# Patient Record
Sex: MALE | Race: WHITE | NOT HISPANIC OR LATINO | Employment: OTHER | URBAN - METROPOLITAN AREA
[De-identification: names, ages, dates, MRNs, and addresses within clinical notes are randomized per-mention and may not be internally consistent; named-entity substitution may affect disease eponyms.]

---

## 2017-01-02 ENCOUNTER — RESULTS ENCOUNTER (OUTPATIENT)
Dept: INTERNAL MEDICINE | Facility: CLINIC | Age: 72
End: 2017-01-02

## 2017-01-02 DIAGNOSIS — E78.5 HYPERLIPIDEMIA: ICD-10-CM

## 2017-01-02 DIAGNOSIS — D69.6 THROMBOCYTOPENIA (HCC): ICD-10-CM

## 2017-01-02 DIAGNOSIS — R73.02 IGT (IMPAIRED GLUCOSE TOLERANCE): ICD-10-CM

## 2017-01-21 LAB
BASOPHILS # BLD AUTO: 0 X10E3/UL (ref 0–0.2)
BASOPHILS NFR BLD AUTO: 1 %
CHOLEST SERPL-MCNC: 131 MG/DL (ref 100–199)
EOSINOPHIL # BLD AUTO: 0.1 X10E3/UL (ref 0–0.4)
EOSINOPHIL NFR BLD AUTO: 2 %
ERYTHROCYTE [DISTWIDTH] IN BLOOD BY AUTOMATED COUNT: 13.9 % (ref 12.3–15.4)
HBA1C MFR BLD: 6.3 % (ref 4.8–5.6)
HCT VFR BLD AUTO: 40.8 % (ref 37.5–51)
HDLC SERPL-MCNC: 51 MG/DL
HGB BLD-MCNC: 13.9 G/DL (ref 12.6–17.7)
IMM GRANULOCYTES # BLD: 0 X10E3/UL (ref 0–0.1)
IMM GRANULOCYTES NFR BLD: 0 %
LDLC SERPL CALC-MCNC: 48 MG/DL (ref 0–99)
LYMPHOCYTES # BLD AUTO: 1.4 X10E3/UL (ref 0.7–3.1)
LYMPHOCYTES NFR BLD AUTO: 22 %
MCH RBC QN AUTO: 27.9 PG (ref 26.6–33)
MCHC RBC AUTO-ENTMCNC: 34.1 G/DL (ref 31.5–35.7)
MCV RBC AUTO: 82 FL (ref 79–97)
MONOCYTES # BLD AUTO: 0.7 X10E3/UL (ref 0.1–0.9)
MONOCYTES NFR BLD AUTO: 11 %
NEUTROPHILS # BLD AUTO: 4.1 X10E3/UL (ref 1.4–7)
NEUTROPHILS NFR BLD AUTO: 64 %
PLATELET # BLD AUTO: 126 X10E3/UL (ref 150–379)
RBC # BLD AUTO: 4.99 X10E6/UL (ref 4.14–5.8)
TRIGL SERPL-MCNC: 161 MG/DL (ref 0–149)
VLDLC SERPL CALC-MCNC: 32 MG/DL (ref 5–40)
WBC # BLD AUTO: 6.4 X10E3/UL (ref 3.4–10.8)

## 2017-01-26 ENCOUNTER — OFFICE VISIT (OUTPATIENT)
Dept: INTERNAL MEDICINE | Facility: CLINIC | Age: 72
End: 2017-01-26

## 2017-01-26 VITALS
DIASTOLIC BLOOD PRESSURE: 52 MMHG | HEART RATE: 70 BPM | TEMPERATURE: 97.6 F | HEIGHT: 70 IN | SYSTOLIC BLOOD PRESSURE: 118 MMHG | WEIGHT: 207.2 LBS | RESPIRATION RATE: 16 BRPM | BODY MASS INDEX: 29.66 KG/M2

## 2017-01-26 DIAGNOSIS — D69.6 THROMBOCYTOPENIA (HCC): ICD-10-CM

## 2017-01-26 DIAGNOSIS — E78.5 HYPERLIPIDEMIA, UNSPECIFIED HYPERLIPIDEMIA TYPE: ICD-10-CM

## 2017-01-26 DIAGNOSIS — N40.1 BENIGN NON-NODULAR PROSTATIC HYPERPLASIA WITH LOWER URINARY TRACT SYMPTOMS: ICD-10-CM

## 2017-01-26 DIAGNOSIS — R73.02 IGT (IMPAIRED GLUCOSE TOLERANCE): Primary | ICD-10-CM

## 2017-01-26 DIAGNOSIS — I47.1 PAT (PAROXYSMAL ATRIAL TACHYCARDIA) (HCC): ICD-10-CM

## 2017-01-26 DIAGNOSIS — K21.9 GASTROESOPHAGEAL REFLUX DISEASE, ESOPHAGITIS PRESENCE NOT SPECIFIED: ICD-10-CM

## 2017-01-26 DIAGNOSIS — Z13.6 SCREENING FOR AAA (ABDOMINAL AORTIC ANEURYSM): ICD-10-CM

## 2017-01-26 DIAGNOSIS — Z79.899 MEDICATION MANAGEMENT: ICD-10-CM

## 2017-01-26 DIAGNOSIS — Z11.59 NEED FOR HEPATITIS C SCREENING TEST: ICD-10-CM

## 2017-01-26 LAB — GLUCOSE BLDC GLUCOMTR-MCNC: 131 MG/DL (ref 70–130)

## 2017-01-26 PROCEDURE — 90715 TDAP VACCINE 7 YRS/> IM: CPT | Performed by: INTERNAL MEDICINE

## 2017-01-26 PROCEDURE — 99214 OFFICE O/P EST MOD 30 MIN: CPT | Performed by: INTERNAL MEDICINE

## 2017-01-26 PROCEDURE — 90670 PCV13 VACCINE IM: CPT | Performed by: INTERNAL MEDICINE

## 2017-01-26 PROCEDURE — 82962 GLUCOSE BLOOD TEST: CPT | Performed by: INTERNAL MEDICINE

## 2017-01-26 PROCEDURE — G0009 ADMIN PNEUMOCOCCAL VACCINE: HCPCS | Performed by: INTERNAL MEDICINE

## 2017-01-26 PROCEDURE — 90472 IMMUNIZATION ADMIN EACH ADD: CPT | Performed by: INTERNAL MEDICINE

## 2017-01-26 NOTE — PROGRESS NOTES
Subjective   Scout Linda is a 71 y.o. male.     Chief Complaint   Patient presents with   • Hyperlipidemia   • Benign Prostatic Hypertrophy         Hyperlipidemia   This is a chronic problem. The current episode started more than 1 year ago. The problem is controlled. Recent lipid tests were reviewed and are normal. There are no known factors aggravating his hyperlipidemia. Pertinent negatives include no chest pain, myalgias or shortness of breath. Current antihyperlipidemic treatment includes statins. The current treatment provides significant improvement of lipids. There are no compliance problems.  Risk factors for coronary artery disease include dyslipidemia and male sex.        The following portions of the patient's history were reviewed and updated as appropriate: allergies, current medications, past social history and problem list.    Outpatient Prescriptions Marked as Taking for the 1/26/17 encounter (Office Visit) with Scout Callaway MD   Medication Sig Dispense Refill   • aspirin 81 MG chewable tablet Chew 81 mg daily.     • doxazosin (CARDURA) 4 MG tablet Take 4 mg by mouth every night.     • finasteride (PROSCAR) 5 MG tablet Take 5 mg by mouth daily.     • MethylPREDNISolone (MEDROL, CECILIA,) 4 MG tablet Take as directed on package instructions. 1 each 0   • metoprolol tartrate (LOPRESSOR) 50 MG tablet Take 25 mg by mouth 2 (two) times a day.     • omeprazole (PriLOSEC) 20 MG capsule Take 20 mg by mouth daily.     • simvastatin (ZOCOR) 40 MG tablet Take 40 mg by mouth daily.     • [DISCONTINUED] gabapentin (NEURONTIN) 300 MG capsule Take 1 capsule by mouth every night. 90 capsule 0       Review of Systems   Constitutional: Negative for chills, fatigue and fever.   Respiratory: Negative for cough, shortness of breath and wheezing.    Cardiovascular: Negative for chest pain, palpitations and leg swelling.   Gastrointestinal: Negative for abdominal pain, constipation, diarrhea, nausea and vomiting.    Musculoskeletal: Negative for myalgias.       Objective   Vitals:    01/26/17 1257   BP: 118/52   Pulse: 70   Resp: 16   Temp: 97.6 °F (36.4 °C)      Last Weight    01/26/17  1257   Weight: 207 lb 3.2 oz (94 kg)    [unfilled]  Body mass index is 29.73 kg/(m^2).      Physical Exam   Constitutional: He appears well-developed and well-nourished. No distress.   HENT:   Head: Normocephalic and atraumatic.   Neck: Carotid bruit is not present. No thyromegaly present.   Cardiovascular: Normal rate, regular rhythm, normal heart sounds and intact distal pulses.  Exam reveals no gallop.    No murmur heard.  Pulmonary/Chest: Effort normal and breath sounds normal. No respiratory distress. He has no wheezes. He has no rales.   Abdominal: Soft. Bowel sounds are normal. He exhibits no mass. There is no tenderness. There is no guarding.   Genitourinary: Rectum normal and prostate normal.         Problem List Items Addressed This Visit        Cardiovascular and Mediastinum    PAT (paroxysmal atrial tachycardia)    Hyperlipidemia    Relevant Orders    Lipid Panel       Digestive    GERD (gastroesophageal reflux disease)       Endocrine    IGT (impaired glucose tolerance) - Primary    Relevant Orders    POC Glucose Fingerstick (Completed)    Hemoglobin A1c       Genitourinary    BPH (benign prostatic hypertrophy)    Relevant Orders    PSA       Hematopoietic and Hemostatic    Thrombocytopenia      Other Visit Diagnoses     Medication management        Relevant Orders    CBC & Differential    Comprehensive Metabolic Panel    Screening for AAA (abdominal aortic aneurysm)        Relevant Orders    US Aorta Medicare Screening    Need for hepatitis C screening test            Assessment/Plan   In for recheck of lipids, low back pain, Igt, and OA.  Back is much better.  Off of treatment.  Annual labs July 2016.  Plts a little low.  Otherwise stable and looking good.  Will continue monitor lipids, glu, a1c, and cbc q 6 mos.  We'll get  Prevnar in TD AP updated today.  Due for AAA screen.         Dragon disclaimer:   Much of this encounter note is an electronic transcription/translation of spoken language to printed text. The electronic translation of spoken language may permit erroneous, or at times, nonsensical words or phrases to be inadvertently transcribed; Although I have reviewed the note for such errors, some may still exist.

## 2017-01-26 NOTE — MR AVS SNAPSHOT
Scout Linda   1/26/2017 1:10 PM   Office Visit    Provider:  Scout Callaway MD   Department:  [facility] INTERNAL MEDICINE   Dept Phone:  [phone]                Your Full Care Plan              Today's Medication Changes          These changes are accurate as of: 1/26/17  2:00 PM.  If you have any questions, ask your nurse or doctor.               Stop taking medication(s)listed here:     gabapentin 300 MG capsule   Commonly known as:  NEURONTIN   Stopped by:  Scout Callaway MD                      Your Updated Medication List          This list is accurate as of: 1/26/17  2:00 PM.  Always use your most recent med list.                aspirin 81 MG chewable tablet       doxazosin 4 MG tablet   Commonly known as:  CARDURA       finasteride 5 MG tablet   Commonly known as:  PROSCAR       MethylPREDNISolone 4 MG tablet   Commonly known as:  MEDROL (CECILIA)   Take as directed on package instructions.       metoprolol tartrate 50 MG tablet   Commonly known as:  LOPRESSOR       omeprazole 20 MG capsule   Commonly known as:  priLOSEC       simvastatin 40 MG tablet   Commonly known as:  ZOCOR               We Performed the Following     Pneumococcal Conjugate Vaccine 13-Valent All     POC Glucose Fingerstick     Tdap Vaccine Greater Than or Equal To 6yo IM       You Were Diagnosed With        Codes Comments    IGT (impaired glucose tolerance)    -  Primary ICD-10-CM: R73.02  ICD-9-CM: 790.22     Hyperlipidemia, unspecified hyperlipidemia type     ICD-10-CM: E78.5  ICD-9-CM: 272.4     Gastroesophageal reflux disease, esophagitis presence not specified     ICD-10-CM: K21.9  ICD-9-CM: 530.81     PAT (paroxysmal atrial tachycardia)     ICD-10-CM: I47.1  ICD-9-CM: 427.0     Thrombocytopenia     ICD-10-CM: D69.6  ICD-9-CM: 287.5     Benign non-nodular prostatic hyperplasia with lower urinary tract symptoms     ICD-10-CM: N40.1  ICD-9-CM: 600.91     Medication management      "ICD-10-CM: Z79.899  ICD-9-CM: V58.69     Screening for AAA (abdominal aortic aneurysm)     ICD-10-CM: Z13.6  ICD-9-CM: V81.2     Need for hepatitis C screening test     ICD-10-CM: Z11.59  ICD-9-CM: V73.89       Instructions     None    Patient Instructions History      [employer] Signup     [facility] [place] [employer] allows you to send messages to your doctor, view your test results, renew your prescriptions, schedule appointments, and more. To sign up, go to [email or web address] and click on the Sign Up Now link in the New User? box. Enter your [employer] Activation Code exactly as it appears below along with the last four digits of your Social Security Number and your Date of Birth () to complete the sign-up process. If you do not sign up before the expiration date, you must request a new code.    [employer]EX Activation Code: [email or web address]  Expires: 2017  1:59 PM    If you have questions, you can email [email or web address] or call [phone] to talk to our [employer] staff. Remember, S[employer]EX is NOT to be used for urgent needs. For medical emergencies, dial 911.               Other Info from Your Visit           Your Appointments     2017  1:00 PM EDT   Follow Up with Scout Callaway MD   [facility] INTERNAL MEDICINE (--)    [address] [address] [address] [phone]           Arrive 15 minutes prior to appointment.            2017  4:10 PM EDT   Subsequent Medicare Wellness with Scout Callaway MD   [facility] INTERNAL MEDICINE (--)    [address] [address] [phone]              Allergies     No Known Allergies      Reason for Visit     Hyperlipidemia     Benign Prostatic Hypertrophy           Vital Signs     Blood Pressure Pulse Temperature Respirations Height Weight    118/52 (BP Location: Left arm, Patient Position: Sitting) 70 97.6 °F (36.4 °C) 16 70\" (177.8 cm) 207 lb 3.2 oz (94 kg)    Body Mass " Index Smoking Status                29.73 kg/m2 Former Smoker          Problems and Diagnoses Noted     Benign enlargement of prostate    Acid reflux disease    High cholesterol or triglycerides    IGT (impaired glucose tolerance)    Irregular heartbeat    Decreased platelet count    Pharmacologic therapy        Screening for abdominal aortic aneurysm        Need for hepatitis C screening test          Immunizations Administered     Name Date    Pneumococcal Conjugate 13-Valent     Tdap       Results     POC Glucose Fingerstick      Component Value Standard Range & Units    Glucose 131 70 - 130 mg/dL

## 2017-02-03 DIAGNOSIS — Z13.6 SCREENING FOR AAA (ABDOMINAL AORTIC ANEURYSM): Primary | ICD-10-CM

## 2017-02-06 ENCOUNTER — HOSPITAL ENCOUNTER (OUTPATIENT)
Dept: ULTRASOUND IMAGING | Facility: HOSPITAL | Age: 72
Discharge: HOME | End: 2017-02-06
Attending: INTERNAL MEDICINE | Admitting: INTERNAL MEDICINE

## 2017-02-06 DIAGNOSIS — Z13.6 SCREENING FOR AAA (ABDOMINAL AORTIC ANEURYSM): ICD-10-CM

## 2017-02-06 PROCEDURE — 76706 US ABDL AORTA SCREEN AAA: CPT

## 2017-07-11 LAB
ALBUMIN SERPL-MCNC: 4.2 G/DL (ref 3.6–4.8)
ALBUMIN/GLOB SERPL: 1.8 {RATIO} (ref 1.2–2.2)
ALP SERPL-CCNC: 41 IU/L (ref 39–117)
ALT SERPL-CCNC: 19 IU/L (ref 0–44)
AST SERPL-CCNC: 13 IU/L (ref 0–40)
BASOPHILS # BLD AUTO: 0 X10E3/UL (ref 0–0.2)
BASOPHILS NFR BLD AUTO: 0 %
BILIRUB SERPL-MCNC: 0.4 MG/DL (ref 0–1.2)
BUN SERPL-MCNC: 14 MG/DL (ref 8–27)
BUN/CREAT SERPL: 11 (ref 10–24)
CALCIUM SERPL-MCNC: 9.3 MG/DL (ref 8.6–10.2)
CHLORIDE SERPL-SCNC: 106 MMOL/L (ref 96–106)
CHOLEST SERPL-MCNC: 126 MG/DL (ref 100–199)
CO2 SERPL-SCNC: 23 MMOL/L (ref 18–29)
CREAT SERPL-MCNC: 1.3 MG/DL (ref 0.76–1.27)
EOSINOPHIL # BLD AUTO: 0.1 X10E3/UL (ref 0–0.4)
EOSINOPHIL NFR BLD AUTO: 2 %
ERYTHROCYTE [DISTWIDTH] IN BLOOD BY AUTOMATED COUNT: 13.7 % (ref 12.3–15.4)
GLOBULIN SER CALC-MCNC: 2.3 G/DL (ref 1.5–4.5)
GLUCOSE SERPL-MCNC: 125 MG/DL (ref 65–99)
HBA1C MFR BLD: 6.3 % (ref 4.8–5.6)
HCT VFR BLD AUTO: 40.3 % (ref 37.5–51)
HCV AB S/CO SERPL IA: 0.1 S/CO RATIO (ref 0–0.9)
HDLC SERPL-MCNC: 44 MG/DL
HGB BLD-MCNC: 13.5 G/DL (ref 12.6–17.7)
IMM GRANULOCYTES # BLD: 0 X10E3/UL (ref 0–0.1)
IMM GRANULOCYTES NFR BLD: 0 %
LDLC SERPL CALC-MCNC: 40 MG/DL (ref 0–99)
LYMPHOCYTES # BLD AUTO: 1.6 X10E3/UL (ref 0.7–3.1)
LYMPHOCYTES NFR BLD AUTO: 28 %
MCH RBC QN AUTO: 28.1 PG (ref 26.6–33)
MCHC RBC AUTO-ENTMCNC: 33.5 G/DL (ref 31.5–35.7)
MCV RBC AUTO: 84 FL (ref 79–97)
MONOCYTES # BLD AUTO: 0.4 X10E3/UL (ref 0.1–0.9)
MONOCYTES NFR BLD AUTO: 8 %
NEUTROPHILS # BLD AUTO: 3.6 X10E3/UL (ref 1.4–7)
NEUTROPHILS NFR BLD AUTO: 62 %
PLATELET # BLD AUTO: 128 X10E3/UL (ref 150–379)
POTASSIUM SERPL-SCNC: 4.7 MMOL/L (ref 3.5–5.2)
PROT SERPL-MCNC: 6.5 G/DL (ref 6–8.5)
PSA SERPL-MCNC: 2.8 NG/ML (ref 0–4)
RBC # BLD AUTO: 4.81 X10E6/UL (ref 4.14–5.8)
SODIUM SERPL-SCNC: 145 MMOL/L (ref 134–144)
TRIGL SERPL-MCNC: 210 MG/DL (ref 0–149)
VLDLC SERPL CALC-MCNC: 42 MG/DL (ref 5–40)
WBC # BLD AUTO: 5.7 X10E3/UL (ref 3.4–10.8)

## 2017-07-12 ENCOUNTER — RESULTS ENCOUNTER (OUTPATIENT)
Dept: INTERNAL MEDICINE | Facility: CLINIC | Age: 72
End: 2017-07-12

## 2017-07-12 DIAGNOSIS — E78.5 HYPERLIPIDEMIA, UNSPECIFIED HYPERLIPIDEMIA TYPE: ICD-10-CM

## 2017-07-12 DIAGNOSIS — R73.02 IGT (IMPAIRED GLUCOSE TOLERANCE): ICD-10-CM

## 2017-07-12 DIAGNOSIS — Z79.899 MEDICATION MANAGEMENT: ICD-10-CM

## 2017-07-12 DIAGNOSIS — N40.1 BENIGN NON-NODULAR PROSTATIC HYPERPLASIA WITH LOWER URINARY TRACT SYMPTOMS: ICD-10-CM

## 2017-07-12 DIAGNOSIS — Z11.59 NEED FOR HEPATITIS C SCREENING TEST: ICD-10-CM

## 2017-07-14 ENCOUNTER — OFFICE VISIT (OUTPATIENT)
Dept: INTERNAL MEDICINE | Facility: CLINIC | Age: 72
End: 2017-07-14

## 2017-07-14 VITALS
RESPIRATION RATE: 16 BRPM | OXYGEN SATURATION: 96 % | BODY MASS INDEX: 29.03 KG/M2 | SYSTOLIC BLOOD PRESSURE: 128 MMHG | WEIGHT: 202.8 LBS | HEIGHT: 70 IN | TEMPERATURE: 97.7 F | HEART RATE: 72 BPM | DIASTOLIC BLOOD PRESSURE: 74 MMHG

## 2017-07-14 DIAGNOSIS — I47.1 PAT (PAROXYSMAL ATRIAL TACHYCARDIA) (HCC): ICD-10-CM

## 2017-07-14 DIAGNOSIS — N40.1 BENIGN NON-NODULAR PROSTATIC HYPERPLASIA WITH LOWER URINARY TRACT SYMPTOMS: ICD-10-CM

## 2017-07-14 DIAGNOSIS — E78.5 HYPERLIPIDEMIA, UNSPECIFIED HYPERLIPIDEMIA TYPE: Primary | ICD-10-CM

## 2017-07-14 DIAGNOSIS — D69.6 THROMBOCYTOPENIA (HCC): ICD-10-CM

## 2017-07-14 DIAGNOSIS — R73.02 IGT (IMPAIRED GLUCOSE TOLERANCE): ICD-10-CM

## 2017-07-14 PROCEDURE — 99214 OFFICE O/P EST MOD 30 MIN: CPT | Performed by: INTERNAL MEDICINE

## 2017-07-14 PROCEDURE — G0439 PPPS, SUBSEQ VISIT: HCPCS | Performed by: INTERNAL MEDICINE

## 2017-07-14 NOTE — PROGRESS NOTES
QUICK REFERENCE INFORMATION:  The ABCs of the Annual Wellness Visit    Subsequent Medicare Wellness Visit    HEALTH RISK ASSESSMENT    1945    Recent Hospitalizations:  No hospitalization(s) within the last year..        Current Medical Providers:  Patient Care Team:  Scout Callaway MD as PCP - Internal Medicine (Internal Medicine)  Scout Callaway MD as PCP - Claims Attributed        Smoking Status:  History   Smoking Status   • Former Smoker   • Years: 11.00   • Quit date: 12/1987   Smokeless Tobacco   • Not on file       Alcohol Consumption:  History   Alcohol Use   • Yes     Comment: 3 beers per week       Depression Screen:   PHQ-9 Depression Screening 7/14/2017   Little interest or pleasure in doing things 0   Feeling down, depressed, or hopeless 0   Trouble falling or staying asleep, or sleeping too much 0   Feeling tired or having little energy 0   Poor appetite or overeating 0   Feeling bad about yourself - or that you are a failure or have let yourself or your family down 0   Trouble concentrating on things, such as reading the newspaper or watching television 0   Moving or speaking so slowly that other people could have noticed. Or the opposite - being so fidgety or restless that you have been moving around a lot more than usual 0   Thoughts that you would be better off dead, or of hurting yourself in some way 0   PHQ-9 Total Score 0       Health Habits and Functional and Cognitive Screening:  Functional & Cognitive Status 7/14/2017   Do you have difficulty preparing food and eating? No   Do you have difficulty bathing yourself? No   Do you have difficulty getting dressed? No   Do you have difficulty using the toilet? No   Do you have difficulty moving around from place to place? No   In the past year have you fallen or experienced a near fall? No   Do you need help using the phone?  No   Are you deaf or do you have serious difficulty hearing?  No   Do you need help with transportation? No   Do you  need help shopping? No   Do you need help preparing meals?  No   Do you need help with housework?  No   Do you need help with laundry? No   Do you need help taking your medications? No   Do you need help managing money? No   Do you have difficulty concentrating, remembering or making decisions? No       Health Habits  Current Diet: Well Balanced Diet  Dental Exam: Up to date  Eye Exam: Up to date  Exercise (times per week): 2 times per week  Current Exercise Activities Include:  (golf)      Does the patient have evidence of cognitive impairment? No    Aspirin use counseling: Taking ASA appropriately as indicated      Recent Lab Results:  CMP:  Lab Results   Component Value Date     (H) 07/10/2017    BUN 14 07/10/2017    CREATININE 1.30 (H) 07/10/2017    EGFRIFNONA 56 (L) 07/10/2017    EGFRIFAFRI 64 07/10/2017    BCR 11 07/10/2017     (H) 07/10/2017    K 4.7 07/10/2017    CO2 23 07/10/2017    CALCIUM 9.3 07/10/2017    PROTENTOTREF 6.5 07/10/2017    ALBUMIN 4.2 07/10/2017    LABGLOBREF 2.3 07/10/2017    LABIL2 1.8 07/10/2017    BILITOT 0.4 07/10/2017    ALKPHOS 41 07/10/2017    AST 13 07/10/2017    ALT 19 07/10/2017     Lipid Panel:  Lab Results   Component Value Date    TRIG 210 (H) 07/10/2017    HDL 44 07/10/2017    VLDL 42 (H) 07/10/2017    LDLHDL 1.6 07/05/2016     HbA1c:  Lab Results   Component Value Date    HGBA1C 6.3 (H) 07/10/2017       Visual Acuity:  No exam data present    Age-appropriate Screening Schedule:  Refer to the list below for future screening recommendations based on patient's age, sex and/or medical conditions. Orders for these recommended tests are listed in the plan section. The patient has been provided with a written plan.    Health Maintenance   Topic Date Due   • INFLUENZA VACCINE  08/01/2017   • LIPID PANEL  07/10/2018   • COLONOSCOPY  06/22/2019   • TDAP/TD VACCINES (2 - Td) 01/26/2027   • PNEUMOCOCCAL VACCINES (65+ LOW/MEDIUM RISK)  Completed   • ZOSTER VACCINE  Completed  "       Subjective   History of Present Illness    Scout Linda is a 71 y.o. male who presents for an Subsequent Wellness Visit.    The following portions of the patient's history were reviewed and updated as appropriate: allergies, current medications, past family history, past medical history, past social history, past surgical history and problem list.    Outpatient Medications Prior to Visit   Medication Sig Dispense Refill   • aspirin 81 MG chewable tablet Chew 81 mg daily.     • doxazosin (CARDURA) 4 MG tablet Take 4 mg by mouth every night.     • finasteride (PROSCAR) 5 MG tablet Take 5 mg by mouth daily.     • metoprolol tartrate (LOPRESSOR) 50 MG tablet Take 25 mg by mouth 2 (two) times a day.     • omeprazole (PriLOSEC) 20 MG capsule Take 20 mg by mouth daily.     • simvastatin (ZOCOR) 40 MG tablet Take 40 mg by mouth daily.     • MethylPREDNISolone (MEDROL, CECILIA,) 4 MG tablet Take as directed on package instructions. 1 each 0     No facility-administered medications prior to visit.        Patient Active Problem List   Diagnosis   • Low back pain   • PAT (paroxysmal atrial tachycardia)   • GERD (gastroesophageal reflux disease)   • Polyp of colon, adenomatous   • IGT (impaired glucose tolerance)   • BPH (benign prostatic hypertrophy)   • Hyperlipidemia   • Spindle cell liposarcoma   • Thrombocytopenia       Advance Care Planning:  has an advance directive - a copy HAS NOT been provided    Identification of Risk Factors:  Risk factors include: no factors apply.    Review of Systems    Compared to one year ago, the patient feels his physical health is the same.  Compared to one year ago, the patient feels his mental health is the same.    Objective     Physical Exam    Vitals:    07/14/17 1315   BP: 128/74   BP Location: Left arm   Patient Position: Sitting   Cuff Size: Large Adult   Pulse: 72   Resp: 16   Temp: 97.7 °F (36.5 °C)   TempSrc: Oral   SpO2: 96%   Weight: 202 lb 12.8 oz (92 kg)   Height: 70\" " (177.8 cm)   PainSc: 0-No pain       Body mass index is 29.1 kg/(m^2).  Discussed the patient's BMI with him. The BMI is in the acceptable range.    Assessment/Plan   Patient Self-Management and Personalized Health Advice  The patient has been provided with information about: back pain and preventive services including:   · managing low back pain.    Visit Diagnoses:    ICD-10-CM ICD-9-CM   1. Hyperlipidemia, unspecified hyperlipidemia type E78.5 272.4   2. Benign non-nodular prostatic hyperplasia with lower urinary tract symptoms N40.1 600.91   3. IGT (impaired glucose tolerance) R73.02 790.22   4. PAT (paroxysmal atrial tachycardia) I47.1 427.0   5. Thrombocytopenia D69.6 287.5       Orders Placed This Encounter   Procedures   • Hemoglobin A1c     Standing Status:   Future   • Lipid Panel     Standing Status:   Future   • CBC & Differential     Standing Status:   Future     Order Specific Question:   Manual Differential     Answer:   No       Outpatient Encounter Prescriptions as of 7/14/2017   Medication Sig Dispense Refill   • aspirin 81 MG chewable tablet Chew 81 mg daily.     • doxazosin (CARDURA) 4 MG tablet Take 4 mg by mouth every night.     • finasteride (PROSCAR) 5 MG tablet Take 5 mg by mouth daily.     • metoprolol tartrate (LOPRESSOR) 50 MG tablet Take 25 mg by mouth 2 (two) times a day.     • omeprazole (PriLOSEC) 20 MG capsule Take 20 mg by mouth daily.     • simvastatin (ZOCOR) 40 MG tablet Take 40 mg by mouth daily.     • [DISCONTINUED] MethylPREDNISolone (MEDROL, CECILIA,) 4 MG tablet Take as directed on package instructions. 1 each 0     No facility-administered encounter medications on file as of 7/14/2017.        Reviewed use of high risk medication in the elderly: yes  Reviewed for potential of harmful drug interactions in the elderly: yes    Follow Up:  Return in about 3 months (around 10/14/2017) for lipids, igt, pat.     An After Visit Summary and PPPS with all of these plans were given to the  patient.

## 2017-07-14 NOTE — PATIENT INSTRUCTIONS
Back Pain, Adult  Back pain is very common in adults. The cause of back pain is rarely dangerous and the pain often gets better over time. The cause of your back pain may not be known. Some common causes of back pain include:  · Strain of the muscles or ligaments supporting the spine.  · Wear and tear (degeneration) of the spinal disks.  · Arthritis.  · Direct injury to the back.  For many people, back pain may return. Since back pain is rarely dangerous, most people can learn to manage this condition on their own.  HOME CARE INSTRUCTIONS  Watch your back pain for any changes. The following actions may help to lessen any discomfort you are feeling:  · Remain active. It is stressful on your back to sit or  one place for long periods of time. Do not sit, drive, or  one place for more than 30 minutes at a time. Take short walks on even surfaces as soon as you are able. Try to increase the length of time you walk each day.  · Exercise regularly as directed by your health care provider. Exercise helps your back heal faster. It also helps avoid future injury by keeping your muscles strong and flexible.  · Do not stay in bed. Resting more than 1-2 days can delay your recovery.  · Pay attention to your body when you bend and lift. The most comfortable positions are those that put less stress on your recovering back. Always use proper lifting techniques, including:    Bending your knees.    Keeping the load close to your body.    Avoiding twisting.  · Find a comfortable position to sleep. Use a firm mattress and lie on your side with your knees slightly bent. If you lie on your back, put a pillow under your knees.  · Avoid feeling anxious or stressed. Stress increases muscle tension and can worsen back pain. It is important to recognize when you are anxious or stressed and learn ways to manage it, such as with exercise.  · Take medicines only as directed by your health care provider. Over-the-counter  medicines to reduce pain and inflammation are often the most helpful. Your health care provider may prescribe muscle relaxant drugs. These medicines help dull your pain so you can more quickly return to your normal activities and healthy exercise.  · Apply ice to the injured area:    Put ice in a plastic bag.    Place a towel between your skin and the bag.    Leave the ice on for 20 minutes, 2-3 times a day for the first 2-3 days. After that, ice and heat may be alternated to reduce pain and spasms.  · Maintain a healthy weight. Excess weight puts extra stress on your back and makes it difficult to maintain good posture.  SEEK MEDICAL CARE IF:  · You have pain that is not relieved with rest or medicine.  · You have increasing pain going down into the legs or buttocks.  · You have pain that does not improve in one week.  · You have night pain.  · You lose weight.  · You have a fever or chills.  SEEK IMMEDIATE MEDICAL CARE IF:   · You develop new bowel or bladder control problems.  · You have unusual weakness or numbness in your arms or legs.  · You develop nausea or vomiting.  · You develop abdominal pain.  · You feel faint.     This information is not intended to replace advice given to you by your health care provider. Make sure you discuss any questions you have with your health care provider.     Document Released: 12/18/2006 Document Revised: 01/08/2016 Document Reviewed: 04/21/2015  [employer] Interactive Patient Education ©2017 [employer]

## 2017-07-14 NOTE — PROGRESS NOTES
Subjective   Scout Linda is a 71 y.o. male.     Chief Complaint   Patient presents with   • Hyperlipidemia     igt   • Heartburn         Hyperlipidemia   This is a chronic problem. The current episode started more than 1 year ago. The problem is controlled. Recent lipid tests were reviewed and are normal. There are no known factors aggravating his hyperlipidemia. Pertinent negatives include no chest pain, myalgias or shortness of breath. Current antihyperlipidemic treatment includes statins. The current treatment provides significant improvement of lipids. There are no compliance problems.  Risk factors for coronary artery disease include dyslipidemia and male sex.   Heartburn   He reports no abdominal pain, no chest pain, no coughing, no nausea or no wheezing. This is a chronic problem. The current episode started more than 1 year ago. The problem occurs rarely. The problem has been unchanged. Pertinent negatives include no fatigue. He has tried a PPI for the symptoms. The treatment provided significant relief.        The following portions of the patient's history were reviewed and updated as appropriate: allergies, current medications, past social history and problem list.    Outpatient Prescriptions Marked as Taking for the 7/14/17 encounter (Office Visit) with Scout Callaway MD   Medication Sig Dispense Refill   • aspirin 81 MG chewable tablet Chew 81 mg daily.     • doxazosin (CARDURA) 4 MG tablet Take 4 mg by mouth every night.     • finasteride (PROSCAR) 5 MG tablet Take 5 mg by mouth daily.     • metoprolol tartrate (LOPRESSOR) 50 MG tablet Take 25 mg by mouth 2 (two) times a day.     • omeprazole (PriLOSEC) 20 MG capsule Take 20 mg by mouth daily.     • simvastatin (ZOCOR) 40 MG tablet Take 40 mg by mouth daily.     • [DISCONTINUED] MethylPREDNISolone (MEDROL, CECILIA,) 4 MG tablet Take as directed on package instructions. 1 each 0       Review of Systems   Constitutional: Negative for chills, fatigue and  fever.   Respiratory: Negative for cough, shortness of breath and wheezing.    Cardiovascular: Negative for chest pain, palpitations and leg swelling.   Gastrointestinal: Negative for abdominal pain, constipation, diarrhea, nausea and vomiting.   Musculoskeletal: Negative for myalgias.       Objective   Vitals:    07/14/17 1315   BP: 128/74   Pulse: 72   Resp: 16   Temp: 97.7 °F (36.5 °C)   SpO2: 96%      Last Weight    07/14/17  1315   Weight: 202 lb 12.8 oz (92 kg)    [unfilled]  Body mass index is 29.1 kg/(m^2).      Physical Exam   Constitutional: He appears well-developed and well-nourished. No distress.   HENT:   Head: Normocephalic and atraumatic.   Neck: Carotid bruit is not present.   Cardiovascular: Normal rate, regular rhythm, normal heart sounds and intact distal pulses.  Exam reveals no gallop.    No murmur heard.  Pulmonary/Chest: Effort normal and breath sounds normal. No respiratory distress. He has no wheezes. He has no rales.   Abdominal: Soft. Bowel sounds are normal. He exhibits no mass. There is no tenderness. There is no guarding.         Problem List Items Addressed This Visit        Cardiovascular and Mediastinum    PAT (paroxysmal atrial tachycardia)    Hyperlipidemia - Primary       Endocrine    IGT (impaired glucose tolerance)       Genitourinary    BPH (benign prostatic hypertrophy)        Assessment/Plan   In for recheck of lipids, GERD, Igt, and OA.  GERD is well controlled on Prilosec daily.  Tolerating lipid treatment well.  Annual labs today July 2017.  Plts a little low.  Otherwise stable and looking good.  Will continue monitor lipids, glu, a1c, and cbc q 6 mos.  Annual wellness exam today.         Dragon disclaimer:   Much of this encounter note is an electronic transcription/translation of spoken language to printed text. The electronic translation of spoken language may permit erroneous, or at times, nonsensical words or phrases to be inadvertently transcribed; Although I  have reviewed the note for such errors, some may still exist.

## 2018-01-01 ENCOUNTER — RESULTS ENCOUNTER (OUTPATIENT)
Dept: INTERNAL MEDICINE | Facility: CLINIC | Age: 73
End: 2018-01-01

## 2018-01-01 DIAGNOSIS — E78.5 HYPERLIPIDEMIA, UNSPECIFIED HYPERLIPIDEMIA TYPE: ICD-10-CM

## 2018-01-01 DIAGNOSIS — D69.6 THROMBOCYTOPENIA (HCC): ICD-10-CM

## 2018-01-01 DIAGNOSIS — R73.02 IGT (IMPAIRED GLUCOSE TOLERANCE): ICD-10-CM

## 2018-01-09 LAB
BASOPHILS # BLD AUTO: 0 X10E3/UL (ref 0–0.2)
BASOPHILS NFR BLD AUTO: 0 %
CHOLEST SERPL-MCNC: 145 MG/DL (ref 100–199)
EOSINOPHIL # BLD AUTO: 0.1 X10E3/UL (ref 0–0.4)
EOSINOPHIL NFR BLD AUTO: 1 %
ERYTHROCYTE [DISTWIDTH] IN BLOOD BY AUTOMATED COUNT: 13.9 % (ref 12.3–15.4)
HBA1C MFR BLD: 6.5 % (ref 4.8–5.6)
HCT VFR BLD AUTO: 38.7 % (ref 37.5–51)
HDLC SERPL-MCNC: 42 MG/DL
HGB BLD-MCNC: 13.6 G/DL (ref 13–17.7)
IMM GRANULOCYTES # BLD: 0 X10E3/UL (ref 0–0.1)
IMM GRANULOCYTES NFR BLD: 0 %
LDLC SERPL CALC-MCNC: 59 MG/DL (ref 0–99)
LYMPHOCYTES # BLD AUTO: 1.6 X10E3/UL (ref 0.7–3.1)
LYMPHOCYTES NFR BLD AUTO: 21 %
MCH RBC QN AUTO: 28.2 PG (ref 26.6–33)
MCHC RBC AUTO-ENTMCNC: 35.1 G/DL (ref 31.5–35.7)
MCV RBC AUTO: 80 FL (ref 79–97)
MONOCYTES # BLD AUTO: 0.6 X10E3/UL (ref 0.1–0.9)
MONOCYTES NFR BLD AUTO: 8 %
NEUTROPHILS # BLD AUTO: 5.5 X10E3/UL (ref 1.4–7)
NEUTROPHILS NFR BLD AUTO: 70 %
PLATELET # BLD AUTO: 162 X10E3/UL (ref 150–379)
RBC # BLD AUTO: 4.83 X10E6/UL (ref 4.14–5.8)
TRIGL SERPL-MCNC: 218 MG/DL (ref 0–149)
VLDLC SERPL CALC-MCNC: 44 MG/DL (ref 5–40)
WBC # BLD AUTO: 7.9 X10E3/UL (ref 3.4–10.8)

## 2018-01-15 ENCOUNTER — OFFICE VISIT (OUTPATIENT)
Dept: INTERNAL MEDICINE | Facility: CLINIC | Age: 73
End: 2018-01-15

## 2018-01-15 VITALS
HEART RATE: 75 BPM | DIASTOLIC BLOOD PRESSURE: 72 MMHG | BODY MASS INDEX: 30.06 KG/M2 | SYSTOLIC BLOOD PRESSURE: 130 MMHG | WEIGHT: 210 LBS | OXYGEN SATURATION: 97 % | RESPIRATION RATE: 16 BRPM | TEMPERATURE: 98.7 F | HEIGHT: 70 IN

## 2018-01-15 DIAGNOSIS — D69.6 THROMBOCYTOPENIA (HCC): ICD-10-CM

## 2018-01-15 DIAGNOSIS — Z79.899 MEDICATION MANAGEMENT: ICD-10-CM

## 2018-01-15 DIAGNOSIS — E78.5 HYPERLIPIDEMIA, UNSPECIFIED HYPERLIPIDEMIA TYPE: Primary | ICD-10-CM

## 2018-01-15 DIAGNOSIS — R73.02 IGT (IMPAIRED GLUCOSE TOLERANCE): ICD-10-CM

## 2018-01-15 DIAGNOSIS — I47.1 PAT (PAROXYSMAL ATRIAL TACHYCARDIA) (HCC): ICD-10-CM

## 2018-01-15 DIAGNOSIS — K21.9 GASTROESOPHAGEAL REFLUX DISEASE, ESOPHAGITIS PRESENCE NOT SPECIFIED: ICD-10-CM

## 2018-01-15 PROBLEM — D58.9 HEMOLYTIC ANEMIA (HCC): Status: ACTIVE | Noted: 2018-01-15

## 2018-01-15 PROCEDURE — 99214 OFFICE O/P EST MOD 30 MIN: CPT | Performed by: INTERNAL MEDICINE

## 2018-01-15 NOTE — PROGRESS NOTES
Subjective   Scout Linda is a 72 y.o. male.     Chief Complaint   Patient presents with   • Hyperlipidemia     pat   • Hyperglycemia         Hyperlipidemia   This is a chronic problem. The current episode started more than 1 year ago. The problem is controlled. Recent lipid tests were reviewed and are normal. There are no known factors aggravating his hyperlipidemia. Pertinent negatives include no chest pain, myalgias or shortness of breath. Current antihyperlipidemic treatment includes statins. The current treatment provides significant improvement of lipids. There are no compliance problems.  Risk factors for coronary artery disease include dyslipidemia and male sex.   Hyperglycemia   This is a chronic problem. Pertinent negatives include no abdominal pain, chest pain, chills, coughing, fatigue, fever, myalgias, nausea or vomiting.   Heartburn   He reports no abdominal pain, no chest pain, no coughing, no nausea or no wheezing. This is a chronic problem. The current episode started more than 1 year ago. The problem occurs rarely. The problem has been unchanged. Pertinent negatives include no fatigue. He has tried a PPI for the symptoms. The treatment provided significant relief.        The following portions of the patient's history were reviewed and updated as appropriate: allergies, current medications, past social history and problem list.    Outpatient Prescriptions Marked as Taking for the 1/15/18 encounter (Office Visit) with Scout Callaway MD   Medication Sig Dispense Refill   • aspirin 81 MG chewable tablet Chew 81 mg daily.     • doxazosin (CARDURA) 4 MG tablet Take 4 mg by mouth every night.     • finasteride (PROSCAR) 5 MG tablet Take 5 mg by mouth daily.     • metoprolol tartrate (LOPRESSOR) 50 MG tablet Take 25 mg by mouth 2 (two) times a day.     • omeprazole (PriLOSEC) 20 MG capsule Take 20 mg by mouth daily.     • simvastatin (ZOCOR) 40 MG tablet Take 40 mg by mouth daily.         Review of  Systems   Constitutional: Negative for chills, fatigue and fever.   Respiratory: Negative for cough, shortness of breath and wheezing.    Cardiovascular: Negative for chest pain, palpitations and leg swelling.   Gastrointestinal: Negative for abdominal pain, constipation, diarrhea, nausea and vomiting.   Musculoskeletal: Negative for myalgias.       Objective   Vitals:    01/15/18 1306   BP: 130/72   Pulse: 75   Resp: 16   Temp: 98.7 °F (37.1 °C)   SpO2: 97%      Last Weight    01/15/18  1306   Weight: 95.3 kg (210 lb)    [unfilled]  Body mass index is 30.13 kg/(m^2).      Physical Exam   Constitutional: He appears well-developed and well-nourished. No distress.   HENT:   Head: Normocephalic and atraumatic.   Neck: Carotid bruit is not present.   Cardiovascular: Normal rate, regular rhythm, normal heart sounds and intact distal pulses.  Exam reveals no gallop.    No murmur heard.  Pulmonary/Chest: Effort normal and breath sounds normal. No respiratory distress. He has no wheezes. He has no rales.   Abdominal: Soft. Bowel sounds are normal. He exhibits no mass. There is no tenderness. There is no guarding.         Problem List Items Addressed This Visit        Cardiovascular and Mediastinum    PAT (paroxysmal atrial tachycardia)    Hyperlipidemia - Primary       Digestive    GERD (gastroesophageal reflux disease)       Endocrine    IGT (impaired glucose tolerance)       Hematopoietic and Hemostatic    Thrombocytopenia        Assessment/Plan   In for recheck of lipids, GERD, Igt, and OA.  GERD is well controlled on Prilosec daily.  Tolerating lipid treatment well.  Annual labs July 2017.  Otherwise stable and looking good.  Will continue monitor lipids, glu, a1c, and cbc q 6 mos.          Dragon disclaimer:   Much of this encounter note is an electronic transcription/translation of spoken language to printed text. The electronic translation of spoken language may permit erroneous, or at times, nonsensical words or  phrases to be inadvertently transcribed; Although I have reviewed the note for such errors, some may still exist.

## 2018-07-01 ENCOUNTER — RESULTS ENCOUNTER (OUTPATIENT)
Dept: INTERNAL MEDICINE | Facility: CLINIC | Age: 73
End: 2018-07-01

## 2018-07-01 DIAGNOSIS — E78.5 HYPERLIPIDEMIA, UNSPECIFIED HYPERLIPIDEMIA TYPE: ICD-10-CM

## 2018-07-01 DIAGNOSIS — Z79.899 MEDICATION MANAGEMENT: ICD-10-CM

## 2018-07-01 DIAGNOSIS — R73.02 IGT (IMPAIRED GLUCOSE TOLERANCE): ICD-10-CM

## 2018-10-03 LAB
ALBUMIN SERPL-MCNC: 3.8 G/DL (ref 3.5–4.8)
ALBUMIN/GLOB SERPL: 1.6 {RATIO} (ref 1.2–2.2)
ALP SERPL-CCNC: 43 IU/L (ref 39–117)
ALT SERPL-CCNC: 21 IU/L (ref 0–44)
AST SERPL-CCNC: 15 IU/L (ref 0–40)
BASOPHILS # BLD AUTO: 0 X10E3/UL (ref 0–0.2)
BASOPHILS NFR BLD AUTO: 0 %
BILIRUB SERPL-MCNC: 0.4 MG/DL (ref 0–1.2)
BUN SERPL-MCNC: 18 MG/DL (ref 8–27)
BUN/CREAT SERPL: 13 (ref 10–24)
CALCIUM SERPL-MCNC: 9.1 MG/DL (ref 8.6–10.2)
CHLORIDE SERPL-SCNC: 108 MMOL/L (ref 96–106)
CHOLEST SERPL-MCNC: 130 MG/DL (ref 100–199)
CO2 SERPL-SCNC: 22 MMOL/L (ref 20–29)
CREAT SERPL-MCNC: 1.44 MG/DL (ref 0.76–1.27)
EOSINOPHIL # BLD AUTO: 0.2 X10E3/UL (ref 0–0.4)
EOSINOPHIL NFR BLD AUTO: 2 %
ERYTHROCYTE [DISTWIDTH] IN BLOOD BY AUTOMATED COUNT: 14.6 % (ref 12.3–15.4)
GLOBULIN SER CALC-MCNC: 2.4 G/DL (ref 1.5–4.5)
GLUCOSE SERPL-MCNC: 139 MG/DL (ref 65–99)
HBA1C MFR BLD: 6.5 % (ref 4.8–5.6)
HCT VFR BLD AUTO: 42.4 % (ref 37.5–51)
HDLC SERPL-MCNC: 46 MG/DL
HGB BLD-MCNC: 13.9 G/DL (ref 13–17.7)
IMM GRANULOCYTES # BLD: 0 X10E3/UL (ref 0–0.1)
IMM GRANULOCYTES NFR BLD: 0 %
LDLC SERPL CALC-MCNC: 52 MG/DL (ref 0–99)
LYMPHOCYTES # BLD AUTO: 1.8 X10E3/UL (ref 0.7–3.1)
LYMPHOCYTES NFR BLD AUTO: 30 %
MCH RBC QN AUTO: 28 PG (ref 26.6–33)
MCHC RBC AUTO-ENTMCNC: 32.8 G/DL (ref 31.5–35.7)
MCV RBC AUTO: 86 FL (ref 79–97)
MONOCYTES # BLD AUTO: 0.5 X10E3/UL (ref 0.1–0.9)
MONOCYTES NFR BLD AUTO: 7 %
NEUTROPHILS # BLD AUTO: 3.7 X10E3/UL (ref 1.4–7)
NEUTROPHILS NFR BLD AUTO: 61 %
PLATELET # BLD AUTO: 138 X10E3/UL (ref 150–379)
POTASSIUM SERPL-SCNC: 4.7 MMOL/L (ref 3.5–5.2)
PROT SERPL-MCNC: 6.2 G/DL (ref 6–8.5)
RBC # BLD AUTO: 4.96 X10E6/UL (ref 4.14–5.8)
SODIUM SERPL-SCNC: 145 MMOL/L (ref 134–144)
TRIGL SERPL-MCNC: 160 MG/DL (ref 0–149)
VLDLC SERPL CALC-MCNC: 32 MG/DL (ref 5–40)
WBC # BLD AUTO: 6.2 X10E3/UL (ref 3.4–10.8)

## 2018-10-09 ENCOUNTER — OFFICE VISIT (OUTPATIENT)
Dept: INTERNAL MEDICINE | Facility: CLINIC | Age: 73
End: 2018-10-09

## 2018-10-09 VITALS
RESPIRATION RATE: 16 BRPM | OXYGEN SATURATION: 98 % | HEIGHT: 70 IN | TEMPERATURE: 98.5 F | WEIGHT: 205.8 LBS | HEART RATE: 83 BPM | SYSTOLIC BLOOD PRESSURE: 112 MMHG | DIASTOLIC BLOOD PRESSURE: 60 MMHG | BODY MASS INDEX: 29.46 KG/M2

## 2018-10-09 DIAGNOSIS — R73.02 IGT (IMPAIRED GLUCOSE TOLERANCE): ICD-10-CM

## 2018-10-09 DIAGNOSIS — N40.1 BENIGN PROSTATIC HYPERPLASIA WITH LOWER URINARY TRACT SYMPTOMS, SYMPTOM DETAILS UNSPECIFIED: ICD-10-CM

## 2018-10-09 DIAGNOSIS — K21.9 GASTROESOPHAGEAL REFLUX DISEASE, ESOPHAGITIS PRESENCE NOT SPECIFIED: ICD-10-CM

## 2018-10-09 DIAGNOSIS — E78.5 HYPERLIPIDEMIA, UNSPECIFIED HYPERLIPIDEMIA TYPE: Primary | ICD-10-CM

## 2018-10-09 DIAGNOSIS — D59.8 OTHER ACQUIRED HEMOLYTIC ANEMIAS (HCC): ICD-10-CM

## 2018-10-09 DIAGNOSIS — C49.9: ICD-10-CM

## 2018-10-09 PROBLEM — N18.30 CKD (CHRONIC KIDNEY DISEASE) STAGE 3, GFR 30-59 ML/MIN: Status: ACTIVE | Noted: 2018-10-09

## 2018-10-09 PROCEDURE — G0439 PPPS, SUBSEQ VISIT: HCPCS | Performed by: INTERNAL MEDICINE

## 2018-10-09 PROCEDURE — 99214 OFFICE O/P EST MOD 30 MIN: CPT | Performed by: INTERNAL MEDICINE

## 2018-10-09 NOTE — PATIENT INSTRUCTIONS
Exercising to Lose Weight  Exercising can help you to lose weight. In order to lose weight through exercise, you need to do vigorous-intensity exercise. You can tell that you are exercising with vigorous intensity if you are breathing very hard and fast and cannot hold a conversation while exercising.  Moderate-intensity exercise helps to maintain your current weight. You can tell that you are exercising at a moderate level if you have a higher heart rate and faster breathing, but you are still able to hold a conversation.  How often should I exercise?  Choose an activity that you enjoy and set realistic goals. Your health care provider can help you to make an activity plan that works for you. Exercise regularly as directed by your health care provider. This may include:  · Doing resistance training twice each week, such as:  ? Push-ups.  ? Sit-ups.  ? Lifting weights.  ? Using resistance bands.  · Doing a given intensity of exercise for a given amount of time. Choose from these options:  ? 150 minutes of moderate-intensity exercise every week.  ? 75 minutes of vigorous-intensity exercise every week.  ? A mix of moderate-intensity and vigorous-intensity exercise every week.    Children, pregnant women, people who are out of shape, people who are overweight, and older adults may need to consult a health care provider for individual recommendations. If you have any sort of medical condition, be sure to consult your health care provider before starting a new exercise program.  What are some activities that can help me to lose weight?  · Walking at a rate of at least 4.5 miles an hour.  · Jogging or running at a rate of 5 miles per hour.  · Biking at a rate of at least 10 miles per hour.  · Lap swimming.  · Roller-skating or in-line skating.  · Cross-country skiing.  · Vigorous competitive sports, such as football, basketball, and soccer.  · Jumping rope.  · Aerobic dancing.  How can I be more active in my day-to-day  activities?  · Use the stairs instead of the elevator.  · Take a walk during your lunch break.  · If you drive, park your car farther away from work or school.  · If you take public transportation, get off one stop early and walk the rest of the way.  · Make all of your phone calls while standing up and walking around.  · Get up, stretch, and walk around every 30 minutes throughout the day.  What guidelines should I follow while exercising?  · Do not exercise so much that you hurt yourself, feel dizzy, or get very short of breath.  · Consult your health care provider prior to starting a new exercise program.  · Wear comfortable clothes and shoes with good support.  · Drink plenty of water while you exercise to prevent dehydration or heat stroke. Body water is lost during exercise and must be replaced.  · Work out until you breathe faster and your heart beats faster.  This information is not intended to replace advice given to you by your health care provider. Make sure you discuss any questions you have with your health care provider.  Document Released: 01/20/2012 Document Revised: 05/25/2017 Document Reviewed: 05/21/2015  [employer] Interactive Patient Education © 2018 [employer]  BMI for Adults  Body mass index (BMI) is a number that is calculated from a person's weight and height. In most adults, the number is used to find how much of an adult's weight is made up of fat. BMI is not as accurate as a direct measure of body fat.  How is BMI calculated?  BMI is calculated by dividing weight in kilograms by height in meters squared. It can also be calculated by dividing weight in pounds by height in inches squared, then multiplying the resulting number by 703. Charts are available to help you find your BMI quickly and easily without doing this calculation.  How is BMI interpreted?  Health care professionals use BMI charts to identify whether an adult is underweight, at a normal weight, or overweight based on the  following guidelines:  · Underweight: BMI less than 18.5.  · Normal weight: BMI between 18.5 and 24.9.  · Overweight: BMI between 25 and 29.9.  · Obese: BMI of 30 and above.    BMI is usually interpreted the same for males and females.  Weight includes both fat and muscle, so someone with a muscular build, such as an athlete, may have a BMI that is higher than 24.9. In cases like these, BMI may not accurately depict body fat. To determine if excess body fat is the cause of a BMI of 25 or higher, further assessments may need to be done by a health care provider.  Why is BMI a useful tool?  BMI is used to identify a possible weight problem that may be related to a medical problem or may increase the risk for medical problems. BMI can also be used to promote changes to reach a healthy weight.  This information is not intended to replace advice given to you by your health care provider. Make sure you discuss any questions you have with your health care provider.  Document Released: 08/29/2005 Document Revised: 04/27/2017 Document Reviewed: 05/15/2015  [employer] Interactive Patient Education © 2018 [employer]    Aspirin and Your Heart  Aspirin is a medicine that affects the way blood clots. Aspirin can be used to help reduce the risk of blood clots, heart attacks, and other heart-related problems.  Should I take aspirin?  Your health care provider will help you determine whether it is safe and beneficial for you to take aspirin daily. Taking aspirin daily may be beneficial if you:  · Have had a heart attack or chest pain.  · Have undergone open heart surgery such as coronary artery bypass surgery (CABG).  · Have had coronary angioplasty.  · Have experienced a stroke or transient ischemic attack (TIA).  · Have peripheral vascular disease (PVD).  · Have chronic heart rhythm problems such as atrial fibrillation.    Are there any risks of taking aspirin daily?  Daily use of aspirin can increase your risk of side effects.  Some of these include:  · Bleeding. Bleeding problems can be minor or serious. An example of a minor problem is a cut that does not stop bleeding. An example of a more serious problem is stomach bleeding or bleeding into the brain. Your risk of bleeding is increased if you are also taking non-steroidal anti-inflammatory medicine (NSAIDs).  · Increased bruising.  · Upset stomach.  · An allergic reaction. People who have nasal polyps have an increased risk of developing an aspirin allergy.    What are some guidelines I should follow when taking aspirin?  · Take aspirin only as directed by your health care provider. Make sure you understand how much you should take and what form you should take. The two forms of aspirin are:  ? Non-enteric-coated. This type of aspirin does not have a coating and is absorbed quickly. Non-enteric-coated aspirin is usually recommended for people with chest pain. This type of aspirin also comes in a chewable form.  ? Enteric-coated. This type of aspirin has a special coating that releases the medicine very slowly. Enteric-coated aspirin causes less stomach upset than non-enteric-coated aspirin. This type of aspirin should not be chewed or crushed.  · Drink alcohol in moderation. Drinking alcohol increases your risk of bleeding.  When should I seek medical care?  · You have unusual bleeding or bruising.  · You have stomach pain.  · You have an allergic reaction. Symptoms of an allergic reaction include:  ? Hives.  ? Itchy skin.  ? Swelling of the lips, tongue, or face.  · You have ringing in your ears.  When should I seek immediate medical care?  · Your bowel movements are bloody, dark red, or black in color.  · You vomit or cough up blood.  · You have blood in your urine.  · You cough, wheeze, or feel short of breath.  If you have any of the following symptoms, this is an emergency. Do not wait to see if the pain will go away. Get medical help at once. Call your local emergency services  (911 in the [place] Do not drive yourself to the hospital.  · You have severe chest pain, especially if the pain is crushing or pressure-like and spreads to the arms, back, neck, or jaw.  · You have stroke-like symptoms, such as:  ? Loss of vision.  ? Difficulty talking.  ? Numbness or weakness on one side of your body.  ? Numbness or weakness in your arm or leg.  ? Not thinking clearly or feeling confused.    This information is not intended to replace advice given to you by your health care provider. Make sure you discuss any questions you have with your health care provider.  Document Released: 11/30/2009 Document Revised: 04/26/2017 Document Reviewed: 03/25/2015  [employer] Interactive Patient Education © 2018 [employer]

## 2018-10-09 NOTE — PROGRESS NOTES
Subjective   Scout Linda is a 73 y.o. male.     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  Also chronic hemolytic anemia.      Hyperlipidemia   This is a chronic problem. The current episode started more than 1 year ago. The problem is controlled. Recent lipid tests were reviewed and are normal. There are no known factors aggravating his hyperlipidemia. Pertinent negatives include no chest pain, myalgias or shortness of breath. Current antihyperlipidemic treatment includes statins. The current treatment provides significant improvement of lipids. There are no compliance problems.  Risk factors for coronary artery disease include dyslipidemia and male sex.   Anemia   Presents for follow-up visit. There has been no bruising/bleeding easily, malaise/fatigue or pallor.   Hyperglycemia   This is a chronic problem. Pertinent negatives include no chest pain, chills, coughing, fatigue, myalgias, nausea or vomiting.   Heartburn   He reports no chest pain, no coughing, no nausea or no wheezing. This is a chronic problem. The current episode started more than 1 year ago. The problem occurs rarely. The problem has been unchanged. Pertinent negatives include no fatigue. He has tried a PPI for the symptoms. The treatment provided significant relief.        The following portions of the patient's history were reviewed and updated as appropriate: allergies, current medications, past social history and problem list.    Outpatient Prescriptions Marked as Taking for the 10/9/18 encounter (Office Visit) with Scout Callaway MD   Medication Sig Dispense Refill   • aspirin 81 MG chewable tablet Chew 81 mg daily.     • doxazosin (CARDURA) 4 MG tablet Take 4 mg by mouth every night.     • finasteride (PROSCAR) 5 MG tablet Take 5 mg by mouth daily.     • metoprolol tartrate (LOPRESSOR) 50 MG tablet Take 25 mg by mouth 2  (two) times a day.     • omeprazole (PriLOSEC) 20 MG capsule Take 20 mg by mouth daily.     • simvastatin (ZOCOR) 40 MG tablet Take 40 mg by mouth daily.         Review of Systems   Constitutional: Negative for chills, fatigue and malaise/fatigue.   Respiratory: Negative for cough, shortness of breath and wheezing.    Cardiovascular: Negative for chest pain and leg swelling.   Gastrointestinal: Negative for constipation, diarrhea, nausea and vomiting.   Musculoskeletal: Negative for myalgias.   Skin: Negative for pallor.   Hematological: Does not bruise/bleed easily.       Objective   Vitals:    10/09/18 1309   BP: 112/60   Pulse: 83   Resp: 16   Temp: 98.5 °F (36.9 °C)   SpO2: 98%      1    10/09/18  1309   Weight: 93.4 kg (205 lb 12.8 oz)    [unfilled]  Body mass index is 29.53 kg/m².      Physical Exam   Constitutional: He appears well-developed and well-nourished. No distress.   HENT:   Head: Normocephalic and atraumatic.   Neck: Carotid bruit is not present.   Cardiovascular: Normal rate, regular rhythm, normal heart sounds and intact distal pulses.  Exam reveals no gallop.    No murmur heard.  Pulmonary/Chest: Effort normal and breath sounds normal. No respiratory distress. He has no wheezes. He has no rales.   Abdominal: Soft. Bowel sounds are normal. He exhibits no mass. There is no tenderness. There is no guarding.         Problem List Items Addressed This Visit        Cardiovascular and Mediastinum    Hyperlipidemia - Primary       Digestive    GERD (gastroesophageal reflux disease)       Endocrine    IGT (impaired glucose tolerance)       Genitourinary    BPH (benign prostatic hypertrophy)        Assessment/Plan   In for recheck of lipids, GERD, Igt, and OA.  GERD is well controlled on Prilosec daily.  Tolerating lipid treatment well.  Blood pressure controls excellent.  Lipid control is excellent.  Impaired glucose tolerance is about the same.  Annual labs today October 2018.  Otherwise stable and  looking good.  Will continue monitor lipids, glu, a1c, and cbc q 6 mos. 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  That was on his lower neck or upper back.  He has a chronic hemolytic anemia that has not been a problem.  Stable.  Discussed the need to avoid dehydration and NSAIDs regarding his CKD.         Dragon disclaimer:   Much of this encounter note is an electronic transcription/translation of spoken language to printed text. The electronic translation of spoken language may permit erroneous, or at times, nonsensical words or phrases to be inadvertently transcribed; Although I have reviewed the note for such errors, some may still exist.

## 2018-10-09 NOTE — PROGRESS NOTES
QUICK REFERENCE INFORMATION:  The ABCs of the Annual Wellness Visit    Subsequent Medicare Wellness Visit    HEALTH RISK ASSESSMENT    1945    Recent Hospitalizations:  No hospitalization(s) within the last year..        Current Medical Providers:  Patient Care Team:  Scout Callaway MD as PCP - General (Internal Medicine)  Scout Callaway MD as PCP - Internal Medicine (Internal Medicine)  Scout Callaway MD as PCP - Claims Attributed        Smoking Status:  History   Smoking Status   • Former Smoker   • Years: 11.00   • Types: Cigarettes   • Quit date: 12/1987   Smokeless Tobacco   • Never Used       Alcohol Consumption:  History   Alcohol Use   • 1.8 oz/week   • 3 Cans of beer per week     Comment: 3 beers per week       Depression Screen:   PHQ-2/PHQ-9 Depression Screening 10/9/2018   Little interest or pleasure in doing things 0   Feeling down, depressed, or hopeless 0   Trouble falling or staying asleep, or sleeping too much 0   Feeling tired or having little energy 0   Poor appetite or overeating 0   Feeling bad about yourself - or that you are a failure or have let yourself or your family down 0   Trouble concentrating on things, such as reading the newspaper or watching television 0   Moving or speaking so slowly that other people could have noticed. Or the opposite - being so fidgety or restless that you have been moving around a lot more than usual 0   Thoughts that you would be better off dead, or of hurting yourself in some way 0   Total Score 0       Health Habits and Functional and Cognitive Screening:  Functional & Cognitive Status 7/14/2017   Do you have difficulty preparing food and eating? No   Do you have difficulty bathing yourself, getting dressed or grooming yourself? No   Do you have difficulty using the toilet? No   Do you have difficulty moving around from place to place? No   In the past year have you fallen or experienced a near fall? No   Current Diet Well Balanced Diet   Dental Exam  Up to date   Eye Exam Up to date   Exercise (times per week) 2 times per week   Current Exercise Activities Include (No Data)   Do you need help using the phone?  No   Are you deaf or do you have serious difficulty hearing?  No   Do you need help with transportation? No   Do you need help shopping? No   Do you need help preparing meals?  No   Do you need help with housework?  No   Do you need help with laundry? No   Do you need help taking your medications? No   Do you need help managing money? No   Do you have difficulty concentrating, remembering or making decisions? No           Does the patient have evidence of cognitive impairment? No    Aspirin use counseling: Taking ASA appropriately as indicated      Recent Lab Results:  CMP:  Lab Results   Component Value Date     (H) 10/02/2018    BUN 18 10/02/2018    CREATININE 1.44 (H) 10/02/2018    EGFRIFNONA 49 (L) 10/02/2018    EGFRIFAFRI 56 (L) 10/02/2018    BCR 13 10/02/2018     (H) 10/02/2018    K 4.7 10/02/2018    CO2 22 10/02/2018    CALCIUM 9.1 10/02/2018    PROTENTOTREF 6.2 10/02/2018    ALBUMIN 3.8 10/02/2018    LABGLOBREF 2.4 10/02/2018    LABIL2 1.6 10/02/2018    BILITOT 0.4 10/02/2018    ALKPHOS 43 10/02/2018    AST 15 10/02/2018    ALT 21 10/02/2018     Lipid Panel:  Lab Results   Component Value Date    TRIG 160 (H) 10/02/2018    HDL 46 10/02/2018    VLDL 32 10/02/2018    LDLHDL 1.6 07/05/2016     HbA1c:  Lab Results   Component Value Date    HGBA1C 6.5 (H) 10/02/2018       Visual Acuity:  No exam data present    Age-appropriate Screening Schedule:  Refer to the list below for future screening recommendations based on patient's age, sex and/or medical conditions. Orders for these recommended tests are listed in the plan section. The patient has been provided with a written plan.    Health Maintenance   Topic Date Due   • ZOSTER VACCINE (2 of 3) 02/26/2008   • COLONOSCOPY  06/22/2019   • LIPID PANEL  10/02/2019   • TDAP/TD VACCINES (2 - Td)  01/26/2027   • INFLUENZA VACCINE  Completed   • PNEUMOCOCCAL VACCINES (65+ LOW/MEDIUM RISK)  Completed        Subjective   History of Present Illness    Scout Linda is a 73 y.o. male who presents for an Subsequent Wellness Visit.    The following portions of the patient's history were reviewed and updated as appropriate: allergies, current medications, past family history, past medical history, past social history, past surgical history and problem list.    Outpatient Medications Prior to Visit   Medication Sig Dispense Refill   • aspirin 81 MG chewable tablet Chew 81 mg daily.     • doxazosin (CARDURA) 4 MG tablet Take 4 mg by mouth every night.     • finasteride (PROSCAR) 5 MG tablet Take 5 mg by mouth daily.     • metoprolol tartrate (LOPRESSOR) 50 MG tablet Take 25 mg by mouth 2 (two) times a day.     • omeprazole (PriLOSEC) 20 MG capsule Take 20 mg by mouth daily.     • simvastatin (ZOCOR) 40 MG tablet Take 40 mg by mouth daily.       No facility-administered medications prior to visit.        Patient Active Problem List   Diagnosis   • Low back pain   • PAT (paroxysmal atrial tachycardia) (CMS/HCC)   • GERD (gastroesophageal reflux disease)   • Polyp of colon, adenomatous   • IGT (impaired glucose tolerance)   • BPH (benign prostatic hypertrophy)   • Hyperlipidemia   • Spindle cell liposarcoma (CMS/HCC)   • Thrombocytopenia (CMS/HCC)   • Hemolytic anemia (CMS/HCC)       Advance Care Planning:  has an advance directive - a copy HAS NOT been provided    Identification of Risk Factors:  Risk factors include: n/A.    Review of Systems    Compared to one year ago, the patient feels his physical health is the same.  Compared to one year ago, the patient feels his mental health is the same.    Objective     Physical Exam    Vitals:    10/09/18 1309   BP: 112/60   BP Location: Left arm   Patient Position: Sitting   Pulse: 83   Resp: 16   Temp: 98.5 °F (36.9 °C)   SpO2: 98%   Weight: 93.4 kg (205 lb 12.8 oz)  "  Height: 177.8 cm (70\")   PainSc: 0-No pain       Patient's Body mass index is 29.53 kg/m². BMI is above normal parameters. Recommendations include: educational material and exercise counseling.      Assessment/Plan   Patient Self-Management and Personalized Health Advice  The patient has been provided with information about: Aspirin and your heart and preventive services including:   · Fall Risk assessment done.    Visit Diagnoses:  No diagnosis found.    No orders of the defined types were placed in this encounter.      Outpatient Encounter Prescriptions as of 10/9/2018   Medication Sig Dispense Refill   • aspirin 81 MG chewable tablet Chew 81 mg daily.     • doxazosin (CARDURA) 4 MG tablet Take 4 mg by mouth every night.     • finasteride (PROSCAR) 5 MG tablet Take 5 mg by mouth daily.     • metoprolol tartrate (LOPRESSOR) 50 MG tablet Take 25 mg by mouth 2 (two) times a day.     • omeprazole (PriLOSEC) 20 MG capsule Take 20 mg by mouth daily.     • simvastatin (ZOCOR) 40 MG tablet Take 40 mg by mouth daily.       No facility-administered encounter medications on file as of 10/9/2018.        Reviewed use of high risk medication in the elderly: yes  Reviewed for potential of harmful drug interactions in the elderly: yes    Follow Up:  No Follow-up on file.     An After Visit Summary and PPPS with all of these plans were given to the patient.         "

## 2019-03-29 ENCOUNTER — RESULTS ENCOUNTER (OUTPATIENT)
Dept: INTERNAL MEDICINE | Facility: CLINIC | Age: 74
End: 2019-03-29

## 2019-03-29 DIAGNOSIS — R73.02 IGT (IMPAIRED GLUCOSE TOLERANCE): ICD-10-CM

## 2019-03-29 DIAGNOSIS — E78.5 HYPERLIPIDEMIA, UNSPECIFIED HYPERLIPIDEMIA TYPE: ICD-10-CM

## 2019-04-03 LAB
CHOLEST SERPL-MCNC: 135 MG/DL (ref 100–199)
GLUCOSE P FAST SERPL-MCNC: 135 MG/DL (ref 65–99)
HBA1C MFR BLD: 6.5 % (ref 4.8–5.6)
HDLC SERPL-MCNC: 47 MG/DL
LDLC SERPL CALC-MCNC: 54 MG/DL (ref 0–99)
TRIGL SERPL-MCNC: 168 MG/DL (ref 0–149)
VLDLC SERPL CALC-MCNC: 34 MG/DL (ref 5–40)

## 2019-04-09 ENCOUNTER — OFFICE VISIT (OUTPATIENT)
Dept: INTERNAL MEDICINE | Facility: CLINIC | Age: 74
End: 2019-04-09

## 2019-04-09 VITALS
HEART RATE: 80 BPM | BODY MASS INDEX: 29.06 KG/M2 | OXYGEN SATURATION: 98 % | TEMPERATURE: 98.4 F | SYSTOLIC BLOOD PRESSURE: 120 MMHG | WEIGHT: 203 LBS | HEIGHT: 70 IN | DIASTOLIC BLOOD PRESSURE: 68 MMHG | RESPIRATION RATE: 16 BRPM

## 2019-04-09 DIAGNOSIS — K21.9 GASTROESOPHAGEAL REFLUX DISEASE, ESOPHAGITIS PRESENCE NOT SPECIFIED: ICD-10-CM

## 2019-04-09 DIAGNOSIS — R73.02 IGT (IMPAIRED GLUCOSE TOLERANCE): ICD-10-CM

## 2019-04-09 DIAGNOSIS — D69.6 THROMBOCYTOPENIA (HCC): ICD-10-CM

## 2019-04-09 DIAGNOSIS — E78.5 HYPERLIPIDEMIA, UNSPECIFIED HYPERLIPIDEMIA TYPE: Primary | ICD-10-CM

## 2019-04-09 DIAGNOSIS — I47.1 PAT (PAROXYSMAL ATRIAL TACHYCARDIA) (HCC): ICD-10-CM

## 2019-04-09 DIAGNOSIS — D59.8 OTHER ACQUIRED HEMOLYTIC ANEMIAS (HCC): ICD-10-CM

## 2019-04-09 DIAGNOSIS — N18.30 CKD (CHRONIC KIDNEY DISEASE) STAGE 3, GFR 30-59 ML/MIN (HCC): ICD-10-CM

## 2019-04-09 DIAGNOSIS — C49.9: ICD-10-CM

## 2019-04-09 PROCEDURE — 99214 OFFICE O/P EST MOD 30 MIN: CPT | Performed by: INTERNAL MEDICINE

## 2019-04-09 NOTE — PATIENT INSTRUCTIONS
Exercising to Stay Healthy  Exercising regularly is important. It has many health benefits, such as:  · Improving your overall fitness, flexibility, and endurance.  · Increasing your bone density.  · Helping with weight control.  · Decreasing your body fat.  · Increasing your muscle strength.  · Reducing stress and tension.  · Improving your overall health.    In order to become healthy and stay healthy, it is recommended that you do moderate-intensity and vigorous-intensity exercise. You can tell that you are exercising at a moderate intensity if you have a higher heart rate and faster breathing, but you are still able to hold a conversation. You can tell that you are exercising at a vigorous intensity if you are breathing much harder and faster and cannot hold a conversation while exercising.  How often should I exercise?  Choose an activity that you enjoy and set realistic goals. Your health care provider can help you to make an activity plan that works for you. Exercise regularly as directed by your health care provider. This may include:  · Doing resistance training twice each week, such as:  ? Push-ups.  ? Sit-ups.  ? Lifting weights.  ? Using resistance bands.  · Doing a given intensity of exercise for a given amount of time. Choose from these options:  ? 150 minutes of moderate-intensity exercise every week.  ? 75 minutes of vigorous-intensity exercise every week.  ? A mix of moderate-intensity and vigorous-intensity exercise every week.    Children, pregnant women, people who are out of shape, people who are overweight, and older adults may need to consult a health care provider for individual recommendations. If you have any sort of medical condition, be sure to consult your health care provider before starting a new exercise program.  What are some exercise ideas?  Some moderate-intensity exercise ideas include:  · Walking at a rate of 1 mile in 15  minutes.  · Biking.  · Hiking.  · Golfing.  · Dancing.    Some vigorous-intensity exercise ideas include:  · Walking at a rate of at least 4.5 miles per hour.  · Jogging or running at a rate of 5 miles per hour.  · Biking at a rate of at least 10 miles per hour.  · Lap swimming.  · Roller-skating or in-line skating.  · Cross-country skiing.  · Vigorous competitive sports, such as football, basketball, and soccer.  · Jumping rope.  · Aerobic dancing.    What are some everyday activities that can help me to get exercise?  · Yard work, such as:  ? Pushing a .  ? Raking and bagging leaves.  · Washing and waxing your car.  · Pushing a stroller.  · Shoveling snow.  · Gardening.  · Washing windows or floors.  How can I be more active in my day-to-day activities?  · Use the stairs instead of the elevator.  · Take a walk during your lunch break.  · If you drive, park your car farther away from work or school.  · If you take public transportation, get off one stop early and walk the rest of the way.  · Make all of your phone calls while standing up and walking around.  · Get up, stretch, and walk around every 30 minutes throughout the day.  What guidelines should I follow while exercising?  · Do not exercise so much that you hurt yourself, feel dizzy, or get very short of breath.  · Consult your health care provider before starting a new exercise program.  · Wear comfortable clothes and shoes with good support.  · Drink plenty of water while you exercise to prevent dehydration or heat stroke. Body water is lost during exercise and must be replaced.  · Work out until you breathe faster and your heart beats faster.  This information is not intended to replace advice given to you by your health care provider. Make sure you discuss any questions you have with your health care provider.  Document Released: 01/20/2012 Document Revised: 05/25/2017 Document Reviewed: 05/21/2015  [employer] Interactive Patient Education © 2018  [employer]  Calorie Counting for Weight Loss  Calories are units of energy. Your body needs a certain amount of calories from food to keep you going throughout the day. When you eat more calories than your body needs, your body stores the extra calories as fat. When you eat fewer calories than your body needs, your body burns fat to get the energy it needs.  Calorie counting means keeping track of how many calories you eat and drink each day. Calorie counting can be helpful if you need to lose weight. If you make sure to eat fewer calories than your body needs, you should lose weight. Ask your health care provider what a healthy weight is for you.  For calorie counting to work, you will need to eat the right number of calories in a day in order to lose a healthy amount of weight per week. A dietitian can help you determine how many calories you need in a day and will give you suggestions on how to reach your calorie goal.  · A healthy amount of weight to lose per week is usually 1-2 lb (0.5-0.9 kg). This usually means that your daily calorie intake should be reduced by 500-750 calories.  · Eating 1,200 - 1,500 calories per day can help most women lose weight.  · Eating 1,500 - 1,800 calories per day can help most men lose weight.    What is my plan?  My goal is to have __________ calories per day.  If I have this many calories per day, I should lose around __________ pounds per week.  What do I need to know about calorie counting?  In order to meet your daily calorie goal, you will need to:  · Find out how many calories are in each food you would like to eat. Try to do this before you eat.  · Decide how much of the food you plan to eat.  · Write down what you ate and how many calories it had. Doing this is called keeping a food log.    To successfully lose weight, it is important to balance calorie counting with a healthy lifestyle that includes regular activity. Aim for 150 minutes of moderate exercise (such as  walking) or 75 minutes of vigorous exercise (such as running) each week.  Where do I find calorie information?    The number of calories in a food can be found on a Nutrition Facts label. If a food does not have a Nutrition Facts label, try to look up the calories online or ask your dietitian for help.  Remember that calories are listed per serving. If you choose to have more than one serving of a food, you will have to multiply the calories per serving by the amount of servings you plan to eat. For example, the label on a package of bread might say that a serving size is 1 slice and that there are 90 calories in a serving. If you eat 1 slice, you will have eaten 90 calories. If you eat 2 slices, you will have eaten 180 calories.  How do I keep a food log?  Immediately after each meal, record the following information in your food log:  · What you ate. Don't forget to include toppings, sauces, and other extras on the food.  · How much you ate. This can be measured in cups, ounces, or number of items.  · How many calories each food and drink had.  · The total number of calories in the meal.    Keep your food log near you, such as in a small notebook in your pocket, or use a mobile nikita or website. Some programs will calculate calories for you and show you how many calories you have left for the day to meet your goal.  What are some calorie counting tips?  · Use your calories on foods and drinks that will fill you up and not leave you hungry:  ? Some examples of foods that fill you up are nuts and nut butters, vegetables, lean proteins, and high-fiber foods like whole grains. High-fiber foods are foods with more than 5 g fiber per serving.  ? Drinks such as sodas, specialty coffee drinks, alcohol, and juices have a lot of calories, yet do not fill you up.  · Eat nutritious foods and avoid empty calories. Empty calories are calories you get from foods or beverages that do not have many vitamins or protein, such as  "candy, sweets, and soda. It is better to have a nutritious high-calorie food (such as an avocado) than a food with few nutrients (such as a bag of chips).  · Know how many calories are in the foods you eat most often. This will help you calculate calorie counts faster.  · Pay attention to calories in drinks. Low-calorie drinks include water and unsweetened drinks.  · Pay attention to nutrition labels for \"low fat\" or \"fat free\" foods. These foods sometimes have the same amount of calories or more calories than the full fat versions. They also often have added sugar, starch, or salt, to make up for flavor that was removed with the fat.  · Find a way of tracking calories that works for you. Get creative. Try different apps or programs if writing down calories does not work for you.  What are some portion control tips?  · Know how many calories are in a serving. This will help you know how many servings of a certain food you can have.  · Use a measuring cup to measure serving sizes. You could also try weighing out portions on a kitchen scale. With time, you will be able to estimate serving sizes for some foods.  · Take some time to put servings of different foods on your favorite plates, bowls, and cups so you know what a serving looks like.  · Try not to eat straight from a bag or box. Doing this can lead to overeating. Put the amount you would like to eat in a cup or on a plate to make sure you are eating the right portion.  · Use smaller plates, glasses, and bowls to prevent overeating.  · Try not to multitask (for example, watch TV or use your computer) while eating. If it is time to eat, sit down at a table and enjoy your food. This will help you to know when you are full. It will also help you to be aware of what you are eating and how much you are eating.  What are tips for following this plan?  Reading food labels  · Check the calorie count compared to the serving size. The serving size may be smaller than what " you are used to eating.  · Check the source of the calories. Make sure the food you are eating is high in vitamins and protein and low in saturated and trans fats.  Shopping  · Read nutrition labels while you shop. This will help you make healthy decisions before you decide to purchase your food.  · Make a grocery list and stick to it.  Cooking  · Try to cook your favorite foods in a healthier way. For example, try baking instead of frying.  · Use low-fat dairy products.  Meal planning  · Use more fruits and vegetables. Half of your plate should be fruits and vegetables.  · Include lean proteins like poultry and fish.  How do I count calories when eating out?  · Ask for smaller portion sizes.  · Consider sharing an entree and sides instead of getting your own entree.  · If you get your own entree, eat only half. Ask for a box at the beginning of your meal and put the rest of your entree in it so you are not tempted to eat it.  · If calories are listed on the menu, choose the lower calorie options.  · Choose dishes that include vegetables, fruits, whole grains, low-fat dairy products, and lean protein.  · Choose items that are boiled, broiled, grilled, or steamed. Stay away from items that are buttered, battered, fried, or served with cream sauce. Items labeled “crispy” are usually fried, unless stated otherwise.  · Choose water, low-fat milk, unsweetened iced tea, or other drinks without added sugar. If you want an alcoholic beverage, choose a lower calorie option such as a glass of wine or light beer.  · Ask for dressings, sauces, and syrups on the side. These are usually high in calories, so you should limit the amount you eat.  · If you want a salad, choose a garden salad and ask for grilled meats. Avoid extra toppings like pleitez, cheese, or fried items. Ask for the dressing on the side, or ask for olive oil and vinegar or lemon to use as dressing.  · Estimate how many servings of a food you are given. For  example, a serving of cooked rice is ½ cup or about the size of half a baseball. Knowing serving sizes will help you be aware of how much food you are eating at restaurants. The list below tells you how big or small some common portion sizes are based on everyday objects:  ? 1 oz--4 stacked dice.  ? 3 oz--1 deck of cards.  ? 1 tsp--1 die.  ? 1 Tbsp--½ a ping-pong ball.  ? 2 Tbsp--1 ping-pong ball.  ? ½ cup--½ baseball.  ? 1 cup--1 baseball.  Summary  · Calorie counting means keeping track of how many calories you eat and drink each day. If you eat fewer calories than your body needs, you should lose weight.  · A healthy amount of weight to lose per week is usually 1-2 lb (0.5-0.9 kg). This usually means reducing your daily calorie intake by 500-750 calories.  · The number of calories in a food can be found on a Nutrition Facts label. If a food does not have a Nutrition Facts label, try to look up the calories online or ask your dietitian for help.  · Use your calories on foods and drinks that will fill you up, and not on foods and drinks that will leave you hungry.  · Use smaller plates, glasses, and bowls to prevent overeating.  This information is not intended to replace advice given to you by your health care provider. Make sure you discuss any questions you have with your health care provider.  Document Released: 12/18/2006 Document Revised: 11/17/2017 Document Reviewed: 11/17/2017  [employer] Interactive Patient Education © 2019 [employer]  BMI for Adults  Body mass index (BMI) is a number that is calculated from a person's weight and height. In most adults, the number is used to find how much of an adult's weight is made up of fat. BMI is not as accurate as a direct measure of body fat.  How is BMI calculated?  BMI is calculated by dividing weight in kilograms by height in meters squared. It can also be calculated by dividing weight in pounds by height in inches squared, then multiplying the resulting number  by 703. Charts are available to help you find your BMI quickly and easily without doing this calculation.  How is BMI interpreted?  Health care professionals use BMI charts to identify whether an adult is underweight, at a normal weight, or overweight based on the following guidelines:  · Underweight: BMI less than 18.5.  · Normal weight: BMI between 18.5 and 24.9.  · Overweight: BMI between 25 and 29.9.  · Obese: BMI of 30 and above.    BMI is usually interpreted the same for males and females.  Weight includes both fat and muscle, so someone with a muscular build, such as an athlete, may have a BMI that is higher than 24.9. In cases like these, BMI may not accurately depict body fat. To determine if excess body fat is the cause of a BMI of 25 or higher, further assessments may need to be done by a health care provider.  Why is BMI a useful tool?  BMI is used to identify a possible weight problem that may be related to a medical problem or may increase the risk for medical problems. BMI can also be used to promote changes to reach a healthy weight.  This information is not intended to replace advice given to you by your health care provider. Make sure you discuss any questions you have with your health care provider.  Document Released: 08/29/2005 Document Revised: 04/27/2017 Document Reviewed: 05/15/2015  [employer] Interactive Patient Education © 2018 [employer]

## 2019-04-09 NOTE — PROGRESS NOTES
Subjective   Scout Linda is a 73 y.o. male.     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  Also chronic hemolytic anemia.  History of PSVT.      Hyperlipidemia   This is a chronic problem. The current episode started more than 1 year ago. The problem is controlled. Recent lipid tests were reviewed and are normal. There are no known factors aggravating his hyperlipidemia. Pertinent negatives include no chest pain, myalgias or shortness of breath. Current antihyperlipidemic treatment includes statins. The current treatment provides significant improvement of lipids. There are no compliance problems.  Risk factors for coronary artery disease include dyslipidemia and male sex.   Hyperglycemia   This is a chronic problem. Pertinent negatives include no chest pain, chills, coughing, myalgias, nausea or vomiting.   Heartburn   He reports no chest pain, no coughing, no nausea or no wheezing. This is a chronic problem. The current episode started more than 1 year ago. The problem occurs rarely. The problem has been unchanged. He has tried a PPI for the symptoms. The treatment provided significant relief.   Osteoarthritis   Presents for follow-up visit. His past medical history is significant for osteoarthritis.   Anemia   Presents for follow-up visit. There has been no bruising/bleeding easily, malaise/fatigue or pallor.        The following portions of the patient's history were reviewed and updated as appropriate: allergies, current medications, past social history and problem list.    Outpatient Medications Marked as Taking for the 4/9/19 encounter (Office Visit) with Scout Callaway MD   Medication Sig Dispense Refill   • aspirin 81 MG chewable tablet Chew 81 mg daily.     • doxazosin (CARDURA) 4 MG tablet Take 4 mg by mouth every night.     • finasteride (PROSCAR)  5 MG tablet Take 5 mg by mouth daily.     • metoprolol tartrate (LOPRESSOR) 50 MG tablet Take 25 mg by mouth 2 (two) times a day.     • omeprazole (PriLOSEC) 20 MG capsule Take 20 mg by mouth daily.     • simvastatin (ZOCOR) 40 MG tablet Take 40 mg by mouth daily.         Review of Systems   Constitutional: Negative for chills and malaise/fatigue.   Respiratory: Negative for cough, shortness of breath and wheezing.    Cardiovascular: Negative for chest pain and leg swelling.   Gastrointestinal: Negative for constipation, nausea and vomiting.   Musculoskeletal: Negative for myalgias.   Skin: Negative for pallor.   Hematological: Does not bruise/bleed easily.       Objective   Vitals:    04/09/19 1440   BP: 120/68   Pulse: 80   Resp: 16   Temp: 98.4 °F (36.9 °C)   SpO2: 98%          04/09/19  1440   Weight: 92.1 kg (203 lb)    [unfilled]  Body mass index is 29.13 kg/m².      Physical Exam   Constitutional: He appears well-developed and well-nourished. No distress.   HENT:   Head: Normocephalic and atraumatic.   Neck: Carotid bruit is not present.   Cardiovascular: Normal rate, regular rhythm, normal heart sounds and intact distal pulses. Exam reveals no gallop.   No murmur heard.  Pulmonary/Chest: Effort normal and breath sounds normal. No respiratory distress. He has no wheezes. He has no rales.   Abdominal: Soft. Bowel sounds are normal. He exhibits no mass. There is no tenderness. There is no guarding.         Problem List Items Addressed This Visit        Cardiovascular and Mediastinum    Hyperlipidemia - Primary       Digestive    GERD (gastroesophageal reflux disease)       Endocrine    IGT (impaired glucose tolerance)       Genitourinary    CKD (chronic kidney disease) stage 3, GFR 30-59 ml/min (CMS/HCC)       Hematopoietic and Hemostatic    Thrombocytopenia (CMS/HCC)        Assessment/Plan   In for recheck of lipids, GERD, Igt, and OA.  GERD is well controlled on Prilosec daily.  Tolerating lipid treatment  well.  Blood pressure controls excellent.  Lipid control is excellent.  Impaired glucose tolerance is about the same.  Annual labs October 2018.  Otherwise stable and looking good.  Will continue monitor lipids, glu, a1c, and cbc q 6 mos. 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  That was on his lower neck or upper back.  He has a chronic hemolytic anemia that has not been a problem.  Stable.  Discussed the need to avoid dehydration and NSAIDs regarding his CKD.  History of PSVT stable.  We discussed the recent data on aspirin prophylaxis we will go ahead and stop his aspirin.         Dragon disclaimer:   Much of this encounter note is an electronic transcription/translation of spoken language to printed text. The electronic translation of spoken language may permit erroneous, or at times, nonsensical words or phrases to be inadvertently transcribed; Although I have reviewed the note for such errors, some may still exist.

## 2019-10-10 LAB
CHOLEST SERPL-MCNC: 118 MG/DL (ref 100–199)
GLUCOSE P FAST SERPL-MCNC: 113 MG/DL (ref 65–99)
HBA1C MFR BLD: 6.3 % (ref 4.8–5.6)
HDLC SERPL-MCNC: 50 MG/DL
LDLC SERPL CALC-MCNC: 43 MG/DL (ref 0–99)
TRIGL SERPL-MCNC: 125 MG/DL (ref 0–149)
VLDLC SERPL CALC-MCNC: 25 MG/DL (ref 5–40)

## 2019-10-14 ENCOUNTER — OFFICE VISIT (OUTPATIENT)
Dept: INTERNAL MEDICINE | Facility: CLINIC | Age: 74
End: 2019-10-14

## 2019-10-14 VITALS
OXYGEN SATURATION: 98 % | HEART RATE: 96 BPM | DIASTOLIC BLOOD PRESSURE: 58 MMHG | HEIGHT: 70 IN | BODY MASS INDEX: 27.92 KG/M2 | SYSTOLIC BLOOD PRESSURE: 122 MMHG | RESPIRATION RATE: 16 BRPM | TEMPERATURE: 98.2 F | WEIGHT: 195 LBS

## 2019-10-14 DIAGNOSIS — K21.9 GASTROESOPHAGEAL REFLUX DISEASE, ESOPHAGITIS PRESENCE NOT SPECIFIED: ICD-10-CM

## 2019-10-14 DIAGNOSIS — R73.02 IGT (IMPAIRED GLUCOSE TOLERANCE): ICD-10-CM

## 2019-10-14 DIAGNOSIS — Z79.899 MEDICATION MANAGEMENT: ICD-10-CM

## 2019-10-14 DIAGNOSIS — E78.5 HYPERLIPIDEMIA, UNSPECIFIED HYPERLIPIDEMIA TYPE: Primary | ICD-10-CM

## 2019-10-14 DIAGNOSIS — N50.89 GENITAL LESION, MALE: ICD-10-CM

## 2019-10-14 DIAGNOSIS — I47.1 PAT (PAROXYSMAL ATRIAL TACHYCARDIA) (HCC): ICD-10-CM

## 2019-10-14 PROCEDURE — 99214 OFFICE O/P EST MOD 30 MIN: CPT | Performed by: INTERNAL MEDICINE

## 2019-10-14 PROCEDURE — G0439 PPPS, SUBSEQ VISIT: HCPCS | Performed by: INTERNAL MEDICINE

## 2019-10-14 RX ORDER — GABAPENTIN 300 MG/1
300 CAPSULE ORAL NIGHTLY PRN
COMMUNITY
End: 2020-06-30 | Stop reason: SDUPTHER

## 2019-10-14 NOTE — PROGRESS NOTES
Subjective   Scout Linda is a 74 y.o. male.     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  Also chronic hemolytic anemia.  History of PSVT.      Hyperlipidemia   This is a chronic problem. The current episode started more than 1 year ago. The problem is controlled. Recent lipid tests were reviewed and are normal. There are no known factors aggravating his hyperlipidemia. Pertinent negatives include no chest pain, myalgias or shortness of breath. Current antihyperlipidemic treatment includes statins. The current treatment provides significant improvement of lipids. There are no compliance problems.  Risk factors for coronary artery disease include dyslipidemia and male sex.   Hyperglycemia   This is a chronic problem. Pertinent negatives include no chest pain, chills, coughing, myalgias, nausea or vomiting.   Heartburn   He reports no chest pain, no coughing, no nausea or no wheezing. This is a chronic problem. The current episode started more than 1 year ago. The problem occurs rarely. The problem has been unchanged. He has tried a PPI for the symptoms. The treatment provided significant relief.   Osteoarthritis   Presents for follow-up visit. His past medical history is significant for osteoarthritis.   Anemia   Presents for follow-up visit. There has been no bruising/bleeding easily, malaise/fatigue or pallor.        The following portions of the patient's history were reviewed and updated as appropriate: allergies, current medications, past social history and problem list.    Outpatient Medications Marked as Taking for the 10/14/19 encounter (Office Visit) with Scout Callaway MD   Medication Sig Dispense Refill   • doxazosin (CARDURA) 4 MG tablet Take 4 mg by mouth every night.     • finasteride (PROSCAR) 5 MG tablet Take 5 mg by mouth daily.     • metoprolol tartrate  (LOPRESSOR) 50 MG tablet Take 25 mg by mouth 2 (two) times a day.     • omeprazole (PriLOSEC) 20 MG capsule Take 20 mg by mouth daily.     • simvastatin (ZOCOR) 40 MG tablet Take 40 mg by mouth daily.         Review of Systems   Constitutional: Negative for chills and malaise/fatigue.   Respiratory: Negative for cough, shortness of breath and wheezing.    Cardiovascular: Negative for chest pain and leg swelling.   Gastrointestinal: Negative for constipation, nausea and vomiting.   Musculoskeletal: Negative for myalgias.   Skin: Negative for pallor.   Hematological: Does not bruise/bleed easily.       Objective   Vitals:    10/14/19 1247   BP: 122/58   Pulse: 96   Resp: 16   Temp: 98.2 °F (36.8 °C)   SpO2: 98%          10/14/19  1247   Weight: 88.5 kg (195 lb)    [unfilled]  Body mass index is 27.98 kg/m².      Physical Exam   Constitutional: He appears well-developed and well-nourished. No distress.   HENT:   Head: Normocephalic and atraumatic.   Neck: Carotid bruit is not present.   Cardiovascular: Normal rate, regular rhythm, normal heart sounds and intact distal pulses. Exam reveals no gallop.   No murmur heard.  Pulmonary/Chest: Effort normal and breath sounds normal. No respiratory distress. He has no wheezes. He has no rales.   Abdominal: Soft. Bowel sounds are normal. He exhibits no mass. There is no tenderness. There is no guarding.         Problem List Items Addressed This Visit        Cardiovascular and Mediastinum    Hyperlipidemia - Primary    PAT (paroxysmal atrial tachycardia) (CMS/HCC)       Digestive    GERD (gastroesophageal reflux disease)       Endocrine    IGT (impaired glucose tolerance)        Assessment/Plan   In for recheck of lipids, GERD, Igt, and OA.  GERD is well controlled on Prilosec daily.  Tolerating lipid treatment well.  Blood pressure controls excellent.  Lipid control is excellent.  Impaired glucose tolerance is about the same.  Annual labs at the [facility] once yearly.  Otherwise  stable and looking good.  Will continue monitor lipids, glu, a1c, and cbc q 6 mos. 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  That was on his lower neck or upper back.  He has a chronic hemolytic anemia that has not been a problem.  Stable.  Discussed the need to avoid dehydration and NSAIDs regarding his CKD.  History of PSVT stable.  We discussed the recent data on aspirin prophylaxis we will go ahead and stop his aspirin.  Annual wellness visit today.  He has had some insomnia since wife passed away.  He is using gabapentin with excellent results.  He is already cut the dose in half.  I think that is good an option as any and will continue to use it as needed for now.         Avinash disclaimer:   Much of this encounter note is an electronic transcription/translation of spoken language to printed text. The electronic translation of spoken language may permit erroneous, or at times, nonsensical words or phrases to be inadvertently transcribed; Although I have reviewed the note for such errors, some may still exist.

## 2019-10-14 NOTE — PROGRESS NOTES
The ABCs of the Annual Wellness Visit  Subsequent Medicare Wellness Visit    Chief Complaint   Patient presents with   • Hyperlipidemia   • Hyperglycemia   • Heartburn       Subjective   History of Present Illness:  Scout Linda is a 74 y.o. male who presents for a Subsequent Medicare Wellness Visit.    HEALTH RISK ASSESSMENT    Recent Hospitalizations:  No hospitalization(s) within the last year.    Current Medical Providers:  Patient Care Team:  Scout Callaway MD as PCP - General (Internal Medicine)  Scout Callaway MD as PCP - Internal Medicine (Internal Medicine)  Scout Callaway MD as PCP - Claims Attributed    Smoking Status:  Social History     Tobacco Use   Smoking Status Former Smoker   • Packs/day: 0.50   • Years: 11.00   • Pack years: 5.50   • Types: Cigarettes   • Start date:    • Last attempt to quit: 1987   • Years since quittin.8   Smokeless Tobacco Never Used       Alcohol Consumption:  Social History     Substance and Sexual Activity   Alcohol Use Yes   • Alcohol/week: 1.8 oz   • Types: 3 Cans of beer per week   • Frequency: 2-3 times a week   • Drinks per session: 1 or 2   • Binge frequency: Never    Comment: 3 beers per week       Depression Screen:   PHQ-2/PHQ-9 Depression Screening 10/14/2019   Little interest or pleasure in doing things 0   Feeling down, depressed, or hopeless 1   Trouble falling or staying asleep, or sleeping too much 1   Feeling tired or having little energy 0   Poor appetite or overeating 2   Feeling bad about yourself - or that you are a failure or have let yourself or your family down 0   Trouble concentrating on things, such as reading the newspaper or watching television 0   Moving or speaking so slowly that other people could have noticed. Or the opposite - being so fidgety or restless that you have been moving around a lot more than usual 0   Thoughts that you would be better off dead, or of hurting yourself in some way 0   Total Score 4   If you  checked off any problems, how difficult have these problems made it for you to do your work, take care of things at home, or get along with other people? Not difficult at all       Fall Risk Screen:  EDYTA Fall Risk Assessment was completed, and patient is at LOW risk for falls.Assessment completed on:10/14/2019    Health Habits and Functional and Cognitive Screening:  Functional & Cognitive Status 10/14/2019   Do you have difficulty preparing food and eating? No   Do you have difficulty bathing yourself, getting dressed or grooming yourself? No   Do you have difficulty using the toilet? No   Do you have difficulty moving around from place to place? No   Do you have trouble with steps or getting out of a bed or a chair? No   Current Diet Well Balanced Diet   Dental Exam Up to date   Eye Exam Up to date   Exercise (times per week) 7 times per week   Current Exercise Activities Include Walking   Do you need help using the phone?  No   Are you deaf or do you have serious difficulty hearing?  No   Do you need help with transportation? No   Do you need help shopping? No   Do you need help preparing meals?  No   Do you need help with housework?  No   Do you need help with laundry? No   Do you need help taking your medications? No   Do you need help managing money? No   Do you ever drive or ride in a car without wearing a seat belt? No   Have you felt unusual stress, anger or loneliness in the last month? Yes   Who do you live with? Alone   If you need help, do you have trouble finding someone available to you? No   Have you been bothered in the last four weeks by sexual problems? No   Do you have difficulty concentrating, remembering or making decisions? No         Does the patient have evidence of cognitive impairment? No    Asprin use counseling:Does not need ASA (and currently is not on it)    Age-appropriate Screening Schedule:  Refer to the list below for future screening recommendations based on patient's age, sex  and/or medical conditions. Orders for these recommended tests are listed in the plan section. The patient has been provided with a written plan.    Health Maintenance   Topic Date Due   • ZOSTER VACCINE (2 of 3) 02/26/2008   • INFLUENZA VACCINE  08/01/2019   • LIPID PANEL  10/09/2020   • COLONOSCOPY  06/22/2021   • TDAP/TD VACCINES (2 - Td) 01/26/2027   • PNEUMOCOCCAL VACCINES (65+ LOW/MEDIUM RISK)  Completed          The following portions of the patient's history were reviewed and updated as appropriate: allergies, current medications, past family history, past medical history, past social history, past surgical history and problem list.    Outpatient Medications Prior to Visit   Medication Sig Dispense Refill   • doxazosin (CARDURA) 4 MG tablet Take 4 mg by mouth every night.     • finasteride (PROSCAR) 5 MG tablet Take 5 mg by mouth daily.     • metoprolol tartrate (LOPRESSOR) 50 MG tablet Take 25 mg by mouth 2 (two) times a day.     • omeprazole (PriLOSEC) 20 MG capsule Take 20 mg by mouth daily.     • simvastatin (ZOCOR) 40 MG tablet Take 40 mg by mouth daily.       No facility-administered medications prior to visit.        Patient Active Problem List   Diagnosis   • Low back pain   • PAT (paroxysmal atrial tachycardia) (CMS/HCC)   • GERD (gastroesophageal reflux disease)   • Polyp of colon, adenomatous   • IGT (impaired glucose tolerance)   • BPH (benign prostatic hypertrophy)   • Hyperlipidemia   • Spindle cell liposarcoma (CMS/HCC)   • Thrombocytopenia (CMS/HCC)   • Hemolytic anemia (CMS/HCC)   • CKD (chronic kidney disease) stage 3, GFR 30-59 ml/min (CMS/HCC)       Advanced Care Planning:  Patient has an advance directive - a copy has not been provided. Have asked the patient to send this to us to add to record    Review of Systems    Compared to one year ago, the patient feels his physical health is the same.  Compared to one year ago, the patient feels his mental health is better.    Reviewed chart for  "potential of high risk medication in the elderly: yes  Reviewed chart for potential of harmful drug interactions in the elderly:yes    Objective         Vitals:    10/14/19 1247   BP: 122/58   Pulse: 96   Resp: 16   Temp: 98.2 °F (36.8 °C)   TempSrc: Oral   SpO2: 98%   Weight: 88.5 kg (195 lb)   Height: 177.8 cm (70\")   PainSc: 0-No pain       Body mass index is 27.98 kg/m².  Discussed the patient's BMI with him. The BMI is in the acceptable range.    Physical Exam    Lab Results   Component Value Date    CHLPL 118 10/09/2019    TRIG 125 10/09/2019    HDL 50 10/09/2019    LDL 43 10/09/2019    VLDL 25 10/09/2019    HGBA1C 6.3 (H) 10/09/2019        Assessment/Plan   Medicare Risks and Personalized Health Plan  CMS Preventative Services Quick Reference  Advance Directive Discussion    The above risks/problems have been discussed with the patient.  Pertinent information has been shared with the patient in the After Visit Summary.  Follow up plans and orders are seen below in the Assessment/Plan Section.    There are no diagnoses linked to this encounter.  Follow Up:  No Follow-up on file.     An After Visit Summary and PPPS were given to the patient.             "

## 2019-10-15 LAB — HSV2 IGG SER IA-ACNC: <0.91 INDEX (ref 0–0.9)

## 2020-05-29 ENCOUNTER — OFFICE VISIT (OUTPATIENT)
Dept: INTERNAL MEDICINE | Facility: CLINIC | Age: 75
End: 2020-05-29

## 2020-05-29 VITALS — HEIGHT: 70 IN | BODY MASS INDEX: 27.98 KG/M2 | RESPIRATION RATE: 16 BRPM

## 2020-05-29 DIAGNOSIS — I47.1 PAT (PAROXYSMAL ATRIAL TACHYCARDIA) (HCC): ICD-10-CM

## 2020-05-29 DIAGNOSIS — D59.8 OTHER ACQUIRED HEMOLYTIC ANEMIAS (HCC): ICD-10-CM

## 2020-05-29 DIAGNOSIS — R73.02 IGT (IMPAIRED GLUCOSE TOLERANCE): ICD-10-CM

## 2020-05-29 DIAGNOSIS — C49.9: ICD-10-CM

## 2020-05-29 DIAGNOSIS — Z01.818 PREOP TESTING: ICD-10-CM

## 2020-05-29 DIAGNOSIS — E78.5 HYPERLIPIDEMIA, UNSPECIFIED HYPERLIPIDEMIA TYPE: ICD-10-CM

## 2020-05-29 DIAGNOSIS — E87.8 ELECTROLYTE ABNORMALITY: Primary | ICD-10-CM

## 2020-05-29 LAB — POTASSIUM SERPL-SCNC: 4.7 MMOL/L (ref 3.5–5.2)

## 2020-05-29 PROCEDURE — 93000 ELECTROCARDIOGRAM COMPLETE: CPT | Performed by: INTERNAL MEDICINE

## 2020-05-29 PROCEDURE — 99214 OFFICE O/P EST MOD 30 MIN: CPT | Performed by: INTERNAL MEDICINE

## 2020-05-29 NOTE — PATIENT INSTRUCTIONS
Advance Directive    Advance directives are legal documents that let you make choices ahead of time about your health care and medical treatment in case you become unable to communicate for yourself. Advance directives are a way for you to communicate your wishes to family, friends, and health care providers. This can help convey your decisions about end-of-life care if you become unable to communicate.  Discussing and writing advance directives should happen over time rather than all at once. Advance directives can be changed depending on your situation and what you want, even after you have signed the advance directives.  If you do not have an advance directive, some states assign family decision makers to act on your behalf based on how closely you are related to them. Each state has its own laws regarding advance directives. You may want to check with your health care provider, , or state representative about the laws in your state. There are different types of advance directives, such as:  · Medical power of .  · Living will.  · Do not resuscitate (DNR) or do not attempt resuscitation (DNAR) order.  Health care proxy and medical power of   A health care proxy, also called a health care agent, is a person who is appointed to make medical decisions for you in cases in which you are unable to make the decisions yourself. Generally, people choose someone they know well and trust to represent their preferences. Make sure to ask this person for an agreement to act as your proxy. A proxy may have to exercise judgment in the event of a medical decision for which your wishes are not known.  A medical power of  is a legal document that names your health care proxy. Depending on the laws in your state, after the document is written, it may also need to be:  · Signed.  · Notarized.  · Dated.  · Copied.  · Witnessed.  · Incorporated into your medical record.  You may also want to appoint  someone to manage your financial affairs in a situation in which you are unable to do so. This is called a durable power of  for finances. It is a separate legal document from the durable power of  for health care. You may choose the same person or someone different from your health care proxy to act as your agent in financial matters.  If you do not appoint a proxy, or if there is a concern that the proxy is not acting in your best interests, a court-appointed guardian may be designated to act on your behalf.  Living will  A living will is a set of instructions documenting your wishes about medical care when you cannot express them yourself. Health care providers should keep a copy of your living will in your medical record. You may want to give a copy to family members or friends. To alert caregivers in case of an emergency, you can place a card in your wallet to let them know that you have a living will and where they can find it. A living will is used if you become:  · Terminally ill.  · Incapacitated.  · Unable to communicate or make decisions.  Items to consider in your living will include:  · The use or non-use of life-sustaining equipment, such as dialysis machines and breathing machines (ventilators).  · A DNR or DNAR order, which is the instruction not to use cardiopulmonary resuscitation (CPR) if breathing or heartbeat stops.  · The use or non-use of tube feeding.  · Withholding of food and fluids.  · Comfort (palliative) care when the goal becomes comfort rather than a cure.  · Organ and tissue donation.  A living will does not give instructions for distributing your money and property if you should pass away. It is recommended that you seek the advice of a  when writing a will. Decisions about taxes, beneficiaries, and asset distribution will be legally binding. This process can relieve your family and friends of any concerns surrounding disputes or questions that may come up about  the distribution of your assets.  DNR or DNAR  A DNR or DNAR order is a request not to have CPR in the event that your heart stops beating or you stop breathing. If a DNR or DNAR order has not been made and shared, a health care provider will try to help any patient whose heart has stopped or who has stopped breathing. If you plan to have surgery, talk with your health care provider about how your DNR or DNAR order will be followed if problems occur.  Summary  · Advance directives are the legal documents that allow you to make choices ahead of time about your health care and medical treatment in case you become unable to communicate for yourself.  · The process of discussing and writing advance directives should happen over time. You can change the advance directives, even after you have signed them.  · Advance directives include DNR or DNAR orders, living kruger, and designating an agent as your medical power of .  This information is not intended to replace advice given to you by your health care provider. Make sure you discuss any questions you have with your health care provider.  Document Released: 03/26/2009 Document Revised: 01/22/2020 Document Reviewed: 11/06/2017  Elsevier Patient Education © 2020 [employer]

## 2020-05-29 NOTE — PROGRESS NOTES
Subjective   Scout Linda is a 74 y.o. male.     Chief Complaint   Patient presents with   • Surgical Clearance         History of Present Illness     The following portions of the patient's history were reviewed and updated as appropriate: allergies, current medications, past social history and problem list.    No outpatient medications have been marked as taking for the 5/29/20 encounter (Office Visit) with Scout Callaway MD.       Review of Systems    Objective   Vitals:    05/29/20 1453   Resp: 16      Wt Readings from Last 3 Encounters:   10/14/19 88.5 kg (195 lb)   04/09/19 92.1 kg (203 lb)   10/09/18 93.4 kg (205 lb 12.8 oz)    Body mass index is 27.98 kg/m².      Physical Exam      Problem List Items Addressed This Visit     None      Visit Diagnoses     Electrolyte abnormality    -  Primary    Relevant Orders    Potassium        Assessment/Plan       ECG 12 Lead  Date/Time: 5/29/2020 3:38 PM  Performed by: Scout Callaway MD  Authorized by: Scout Callaway MD   Comparison: not compared with previous ECG   Previous ECG: no previous ECG available  Rhythm: sinus rhythm  Rate: normal  Conduction: conduction normal  ST Segments: ST segments normal  T Waves: T waves normal  QRS axis: normal  Other: no other findings    Clinical impression: normal ECG  Comments: Normal sinus rhythm.  Heart rate is 78.  This is a normal EKG.  There is no prior EKG available for comparison.                       Dragon disclaimer:   Much of this encounter note is an electronic transcription/translation of spoken language to printed text. The electronic translation of spoken language may permit erroneous, or at times, nonsensical words or phrases to be inadvertently transcribed; Although I have reviewed the note for such errors, some may still exist.

## 2020-05-29 NOTE — PROGRESS NOTES
Subjective   Scout Linda is a 74 y.o. male.     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  Also chronic hemolytic anemia.  History of PSVT.    Hyperlipidemia   This is a chronic problem. The current episode started more than 1 year ago. The problem is controlled. Recent lipid tests were reviewed and are normal. There are no known factors aggravating his hyperlipidemia. Pertinent negatives include no chest pain, myalgias or shortness of breath. Current antihyperlipidemic treatment includes statins. The current treatment provides significant improvement of lipids. There are no compliance problems.  Risk factors for coronary artery disease include dyslipidemia and male sex.   Hyperglycemia   This is a chronic problem. Pertinent negatives include no chest pain, chills, coughing, myalgias, nausea or vomiting.   Heartburn   He reports no chest pain, no coughing, no nausea or no wheezing. This is a chronic problem. The current episode started more than 1 year ago. The problem occurs rarely. The problem has been unchanged. He has tried a PPI for the symptoms. The treatment provided significant relief.   Osteoarthritis   Presents for follow-up visit. His past medical history is significant for osteoarthritis.   Anemia   Presents for follow-up visit. There has been no bruising/bleeding easily, malaise/fatigue or pallor.        The following portions of the patient's history were reviewed and updated as appropriate: allergies, current medications, past social history and problem list.    No outpatient medications have been marked as taking for the 5/29/20 encounter (Office Visit) with Scout Callaway MD.       Review of Systems   Constitutional: Negative for chills and malaise/fatigue.   Respiratory: Negative for cough, shortness of breath and wheezing.    Cardiovascular: Negative for chest pain.    Gastrointestinal: Negative for nausea and vomiting.   Musculoskeletal: Negative for myalgias.   Skin: Negative for pallor.   Hematological: Does not bruise/bleed easily.       Objective   Vitals:    05/29/20 1453   Resp: 16      Wt Readings from Last 3 Encounters:   10/14/19 88.5 kg (195 lb)   04/09/19 92.1 kg (203 lb)   10/09/18 93.4 kg (205 lb 12.8 oz)    Body mass index is 27.98 kg/m².      Physical Exam   Constitutional: He appears well-developed and well-nourished. No distress.   HENT:   Head: Normocephalic and atraumatic.   Neck: Carotid bruit is not present.   Cardiovascular: Normal rate, regular rhythm, normal heart sounds and intact distal pulses. Exam reveals no gallop.   No murmur heard.  Pulmonary/Chest: Effort normal and breath sounds normal. No respiratory distress. He has no wheezes. He has no rales.   Abdominal: Soft. Bowel sounds are normal. He exhibits no mass. There is no tenderness. There is no guarding.         Problem List Items Addressed This Visit     None      Visit Diagnoses     Electrolyte abnormality    -  Primary    Relevant Orders    Potassium    Preop testing        Relevant Orders    ECG 12 Lead        Assessment/Plan   In for recheck of lipids, GERD, Igt, and OA.  GERD is well controlled on Prilosec daily.  Tolerating lipid treatment well.  Blood pressure controls excellent.  Lipid control is excellent.  Impaired glucose tolerance is about the same.  [facility] labs at the [facility] once yearly.  Otherwise stable and looking good.  Will continue monitor lipids, glu, a1c, and cbc q 6 mos. also needs potassium today and EKG for upcoming surgery clearance.  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  That was on his lower neck or upper back.  He has a chronic hemolytic anemia that has not been a problem.  Stable.  Discussed the need to avoid dehydration and NSAIDs regarding his CKD.  History of PSVT stable.  Annual wellness visit October 2019.  He has had some insomnia since  wife passed away.  He is pretty much off gabapentin but just takes it occasionally as needed for sleep which is fine.             Dragon disclaimer:   Much of this encounter note is an electronic transcription/translation of spoken language to printed text. The electronic translation of spoken language may permit erroneous, or at times, nonsensical words or phrases to be inadvertently transcribed; Although I have reviewed the note for such errors, some may still exist.

## 2020-06-01 LAB
BASOPHILS # BLD AUTO: 0.03 10*3/MM3 (ref 0–0.2)
BASOPHILS NFR BLD AUTO: 0.4 % (ref 0–1.5)
CHOLEST SERPL-MCNC: 170 MG/DL (ref 0–200)
EOSINOPHIL # BLD AUTO: 0.09 10*3/MM3 (ref 0–0.4)
EOSINOPHIL NFR BLD AUTO: 1.3 % (ref 0.3–6.2)
ERYTHROCYTE [DISTWIDTH] IN BLOOD BY AUTOMATED COUNT: 13.8 % (ref 12.3–15.4)
GLUCOSE P FAST SERPL-MCNC: 106 MG/DL (ref 65–99)
HBA1C MFR BLD: 6.6 % (ref 4.8–5.6)
HCT VFR BLD AUTO: 39.6 % (ref 37.5–51)
HDLC SERPL-MCNC: 39 MG/DL (ref 40–60)
HGB BLD-MCNC: 13.3 G/DL (ref 13–17.7)
IMM GRANULOCYTES # BLD AUTO: 0.04 10*3/MM3 (ref 0–0.05)
IMM GRANULOCYTES NFR BLD AUTO: 0.6 % (ref 0–0.5)
LDLC SERPL CALC-MCNC: ABNORMAL MG/DL
LYMPHOCYTES # BLD AUTO: 1.48 10*3/MM3 (ref 0.7–3.1)
LYMPHOCYTES NFR BLD AUTO: 20.6 % (ref 19.6–45.3)
MCH RBC QN AUTO: 29.4 PG (ref 26.6–33)
MCHC RBC AUTO-ENTMCNC: 33.6 G/DL (ref 31.5–35.7)
MCV RBC AUTO: 87.4 FL (ref 79–97)
MONOCYTES # BLD AUTO: 0.71 10*3/MM3 (ref 0.1–0.9)
MONOCYTES NFR BLD AUTO: 9.9 % (ref 5–12)
NEUTROPHILS # BLD AUTO: 4.84 10*3/MM3 (ref 1.7–7)
NEUTROPHILS NFR BLD AUTO: 67.2 % (ref 42.7–76)
NRBC BLD AUTO-RTO: 0.1 /100 WBC (ref 0–0.2)
PLATELET # BLD AUTO: 131 10*3/MM3 (ref 140–450)
RBC # BLD AUTO: 4.53 10*6/MM3 (ref 4.14–5.8)
TRIGL SERPL-MCNC: 418 MG/DL (ref 0–150)
VLDLC SERPL CALC-MCNC: ABNORMAL MG/DL
WBC # BLD AUTO: 7.19 10*3/MM3 (ref 3.4–10.8)

## 2020-06-30 ENCOUNTER — TELEPHONE (OUTPATIENT)
Dept: INTERNAL MEDICINE | Facility: CLINIC | Age: 75
End: 2020-06-30

## 2020-06-30 ENCOUNTER — TELEMEDICINE (OUTPATIENT)
Dept: INTERNAL MEDICINE | Facility: CLINIC | Age: 75
End: 2020-06-30

## 2020-06-30 VITALS — DIASTOLIC BLOOD PRESSURE: 89 MMHG | OXYGEN SATURATION: 98 % | SYSTOLIC BLOOD PRESSURE: 144 MMHG | HEART RATE: 87 BPM

## 2020-06-30 DIAGNOSIS — M54.50 ACUTE BILATERAL LOW BACK PAIN WITHOUT SCIATICA: Primary | ICD-10-CM

## 2020-06-30 PROCEDURE — 99213 OFFICE O/P EST LOW 20 MIN: CPT | Performed by: INTERNAL MEDICINE

## 2020-06-30 RX ORDER — GABAPENTIN 300 MG/1
300 CAPSULE ORAL NIGHTLY PRN
Qty: 30 CAPSULE | Refills: 0 | Status: SHIPPED | OUTPATIENT
Start: 2020-06-30 | End: 2020-11-30 | Stop reason: SDUPTHER

## 2020-06-30 RX ORDER — PREDNISONE 1 MG/1
TABLET ORAL
Qty: 42 TABLET | Refills: 0 | Status: SHIPPED | OUTPATIENT
Start: 2020-06-30 | End: 2020-06-30 | Stop reason: SDUPTHER

## 2020-06-30 RX ORDER — PREDNISONE 1 MG/1
TABLET ORAL
Qty: 42 TABLET | Refills: 0 | Status: SHIPPED | OUTPATIENT
Start: 2020-06-30 | End: 2020-09-28

## 2020-06-30 NOTE — TELEPHONE ENCOUNTER
Medications from today's telemed appt have been LM on pharmacy VM, due to technical difficulty with pharmacy. Unable to e-scribe meds.

## 2020-06-30 NOTE — PROGRESS NOTES
Subjective   Scout Linda is a 74 y.o. male.     Chief Complaint   Patient presents with   • Back Pain         Back Pain   This is a new problem. The current episode started in the past 7 days. The problem has been gradually worsening since onset. The pain is present in the lumbar spine. The quality of the pain is described as aching. The pain does not radiate. The pain is severe. The symptoms are aggravated by bending and twisting. Pertinent negatives include no bladder incontinence, bowel incontinence, fever, numbness, paresis, paresthesias or weakness. He has tried analgesics for the symptoms. The treatment provided moderate relief.        The following portions of the patient's history were reviewed and updated as appropriate: allergies, current medications, past social history and problem list.    Outpatient Medications Marked as Taking for the 6/30/20 encounter (Telemedicine) with Scout Callaway MD   Medication Sig Dispense Refill   • doxazosin (CARDURA) 4 MG tablet Take 4 mg by mouth every night.     • finasteride (PROSCAR) 5 MG tablet Take 5 mg by mouth daily.     • gabapentin (NEURONTIN) 300 MG capsule Take 1 capsule by mouth At Night As Needed (low back pain). 30 capsule 0   • metoprolol tartrate (LOPRESSOR) 50 MG tablet Take 25 mg by mouth 2 (two) times a day.     • omeprazole (PriLOSEC) 20 MG capsule Take 20 mg by mouth daily.     • simvastatin (ZOCOR) 40 MG tablet Take 40 mg by mouth daily.     • [DISCONTINUED] gabapentin (NEURONTIN) 300 MG capsule Take 300 mg by mouth At Night As Needed for Sleep.         Review of Systems   Constitutional: Negative for chills and fever.   Gastrointestinal: Negative for bowel incontinence.   Genitourinary: Negative for bladder incontinence.   Musculoskeletal: Positive for back pain.   Neurological: Negative for weakness, numbness and paresthesias.       Objective   Vitals:    06/30/20 0923   BP: 144/89   Pulse: 87   SpO2: 98%      Wt Readings from Last 3  Encounters:   10/14/19 88.5 kg (195 lb)   04/09/19 92.1 kg (203 lb)   10/09/18 93.4 kg (205 lb 12.8 oz)    There is no height or weight on file to calculate BMI.      Physical Exam   Constitutional: He appears well-developed and well-nourished. No distress.   Skin: He is not diaphoretic.   Psychiatric: He has a normal mood and affect. His behavior is normal. Judgment and thought content normal.         Problem List Items Addressed This Visit        Nervous and Auditory    Low back pain - Primary    Relevant Medications    gabapentin (NEURONTIN) 300 MG capsule        Assessment/Plan   Video visit today due to COVID-19 pandemic.  He has had low back pain for about 1 week.  He been sleeping on a couch for 2 weeks following his facial surgery.  That may have set him up for this.  He has had low back strain in the past.  It sounds like typical lumbago.  No radicular symptoms.  No red flags.  Will begin on gabapentin 300 mg at bedtime #30.  Two-week Pred pack.  Went over do's and don'ts with patient today.  Spent 15 minutes with patient on the visit today.             Dragon disclaimer:   Much of this encounter note is an electronic transcription/translation of spoken language to printed text. The electronic translation of spoken language may permit erroneous, or at times, nonsensical words or phrases to be inadvertently transcribed; Although I have reviewed the note for such errors, some may still exist.

## 2020-09-28 ENCOUNTER — TELEPHONE (OUTPATIENT)
Dept: INTERNAL MEDICINE | Facility: CLINIC | Age: 75
End: 2020-09-28

## 2020-09-28 ENCOUNTER — TELEMEDICINE (OUTPATIENT)
Dept: INTERNAL MEDICINE | Facility: CLINIC | Age: 75
End: 2020-09-28

## 2020-09-28 VITALS
HEIGHT: 70 IN | DIASTOLIC BLOOD PRESSURE: 85 MMHG | SYSTOLIC BLOOD PRESSURE: 130 MMHG | RESPIRATION RATE: 16 BRPM | BODY MASS INDEX: 27.98 KG/M2

## 2020-09-28 DIAGNOSIS — N40.1 BENIGN PROSTATIC HYPERPLASIA WITH LOWER URINARY TRACT SYMPTOMS, SYMPTOM DETAILS UNSPECIFIED: ICD-10-CM

## 2020-09-28 DIAGNOSIS — N52.8 OTHER MALE ERECTILE DYSFUNCTION: Primary | ICD-10-CM

## 2020-09-28 PROCEDURE — 99214 OFFICE O/P EST MOD 30 MIN: CPT | Performed by: INTERNAL MEDICINE

## 2020-09-28 RX ORDER — SILDENAFIL 25 MG/1
25-100 TABLET, FILM COATED ORAL DAILY PRN
Qty: 30 TABLET | Refills: 10 | Status: SHIPPED | OUTPATIENT
Start: 2020-09-28 | End: 2021-06-30

## 2020-09-28 NOTE — TELEPHONE ENCOUNTER
LM on pharmacy VM with sildenafil script. Pt was notified of call in today. Advised to call pharmacy to confirm when ready.

## 2020-09-28 NOTE — PROGRESS NOTES
Subjective   Scout Linda is a 74 y.o. male.     Chief Complaint   Patient presents with   • Libido         Erectile Dysfunction  This is a new problem. The current episode started more than 1 month ago. The problem is unchanged. He reports no decreased libido. Nothing aggravates the symptoms.        The following portions of the patient's history were reviewed and updated as appropriate: allergies, current medications, past social history and problem list.    Outpatient Medications Marked as Taking for the 9/28/20 encounter (Telemedicine) with Scout Callaway MD   Medication Sig Dispense Refill   • doxazosin (CARDURA) 4 MG tablet Take 4 mg by mouth every night.     • finasteride (PROSCAR) 5 MG tablet Take 5 mg by mouth daily.     • gabapentin (NEURONTIN) 300 MG capsule Take 1 capsule by mouth At Night As Needed (low back pain). 30 capsule 0   • metoprolol tartrate (LOPRESSOR) 50 MG tablet Take 25 mg by mouth 2 (two) times a day.     • omeprazole (PriLOSEC) 20 MG capsule Take 20 mg by mouth daily.     • simvastatin (ZOCOR) 40 MG tablet Take 40 mg by mouth daily.         Review of Systems   Genitourinary: Positive for decreased urine volume and difficulty urinating. Negative for decreased libido.   Neurological: Negative for light-headedness.       Objective   Vitals:    09/28/20 0800   BP: 130/85   Resp: 16      Wt Readings from Last 3 Encounters:   10/14/19 88.5 kg (195 lb)   04/09/19 92.1 kg (203 lb)   10/09/18 93.4 kg (205 lb 12.8 oz)    Body mass index is 27.98 kg/m².      Physical Exam  Constitutional:       Appearance: Normal appearance.   Neurological:      Mental Status: He is alert.   Psychiatric:         Mood and Affect: Mood normal.         Behavior: Behavior normal.         Thought Content: Thought content normal.         Judgment: Judgment normal.           Problem List Items Addressed This Visit        Genitourinary    BPH (benign prostatic hypertrophy)       Other    Other male erectile  dysfunction - Primary        Assessment/Plan   Video visit today due to covered pandemic.  He is having troubles with PD.  His wife is passed away and he has a new partner and they are interested in intimacy.  He is having trouble with both getting and containing erections.  No trouble with libido.  He is going to try stopping his Proscar in case that is causing some issues.  He is read that statins can also cause a problem with erectile dysfunction and I am not aware of that.  Advised left be careful with ED drugs since he is on Cardura and he will try not taking them at the same time for now.  We went over variety of ED options including erect aid device, injections, and implants.  He might consider neurectomy device at a later date but was not too interested just yet.  He also mentions some flare of his back from time to time.  Advised that is going to be the case in the future for him.  Will begin on sildenafil 25 to 100 mg as needed and see how he does.  Perhaps as he gets further away from Proscar his sildenafil needs will diminish.  Advised against testosterone treatment given his comorbidities.  Spent 25 minutes with patient on the video visit today.         Dragon disclaimer:   Much of this encounter note is an electronic transcription/translation of spoken language to printed text. The electronic translation of spoken language may permit erroneous, or at times, nonsensical words or phrases to be inadvertently transcribed; Although I have reviewed the note for such errors, some may still exist.

## 2020-11-24 LAB
BASOPHILS # BLD AUTO: 0 X10E3/UL (ref 0–0.2)
BASOPHILS NFR BLD AUTO: 1 %
CHOLEST SERPL-MCNC: 131 MG/DL (ref 100–199)
CHOLEST/HDLC SERPL: 2.4 RATIO (ref 0–5)
EOSINOPHIL # BLD AUTO: 0.1 X10E3/UL (ref 0–0.4)
EOSINOPHIL NFR BLD AUTO: 1 %
ERYTHROCYTE [DISTWIDTH] IN BLOOD BY AUTOMATED COUNT: 13.4 % (ref 11.6–15.4)
GLUCOSE P FAST SERPL-MCNC: 135 MG/DL (ref 65–99)
HBA1C MFR BLD: 6.4 % (ref 4.8–5.6)
HCT VFR BLD AUTO: 42.5 % (ref 37.5–51)
HDLC SERPL-MCNC: 54 MG/DL
HGB BLD-MCNC: 14.6 G/DL (ref 13–17.7)
IMM GRANULOCYTES # BLD AUTO: 0 X10E3/UL (ref 0–0.1)
IMM GRANULOCYTES NFR BLD AUTO: 0 %
LDLC SERPL CALC-MCNC: 58 MG/DL (ref 0–99)
LYMPHOCYTES # BLD AUTO: 1 X10E3/UL (ref 0.7–3.1)
LYMPHOCYTES NFR BLD AUTO: 23 %
MCH RBC QN AUTO: 29.6 PG (ref 26.6–33)
MCHC RBC AUTO-ENTMCNC: 34.4 G/DL (ref 31.5–35.7)
MCV RBC AUTO: 86 FL (ref 79–97)
MONOCYTES # BLD AUTO: 0.5 X10E3/UL (ref 0.1–0.9)
MONOCYTES NFR BLD AUTO: 12 %
NEUTROPHILS # BLD AUTO: 2.9 X10E3/UL (ref 1.4–7)
NEUTROPHILS NFR BLD AUTO: 63 %
PLATELET # BLD AUTO: 143 X10E3/UL (ref 150–450)
RBC # BLD AUTO: 4.94 X10E6/UL (ref 4.14–5.8)
TRIGL SERPL-MCNC: 102 MG/DL (ref 0–149)
VLDLC SERPL CALC-MCNC: 19 MG/DL (ref 5–40)
WBC # BLD AUTO: 4.5 X10E3/UL (ref 3.4–10.8)

## 2020-11-30 ENCOUNTER — TELEMEDICINE (OUTPATIENT)
Dept: INTERNAL MEDICINE | Facility: CLINIC | Age: 75
End: 2020-11-30

## 2020-11-30 ENCOUNTER — TELEPHONE (OUTPATIENT)
Dept: INTERNAL MEDICINE | Facility: CLINIC | Age: 75
End: 2020-11-30

## 2020-11-30 DIAGNOSIS — D59.8 OTHER ACQUIRED HEMOLYTIC ANEMIAS (HCC): ICD-10-CM

## 2020-11-30 DIAGNOSIS — R73.02 IGT (IMPAIRED GLUCOSE TOLERANCE): ICD-10-CM

## 2020-11-30 DIAGNOSIS — M54.50 ACUTE BILATERAL LOW BACK PAIN WITHOUT SCIATICA: ICD-10-CM

## 2020-11-30 DIAGNOSIS — K21.9 GASTROESOPHAGEAL REFLUX DISEASE, UNSPECIFIED WHETHER ESOPHAGITIS PRESENT: ICD-10-CM

## 2020-11-30 DIAGNOSIS — E78.5 HYPERLIPIDEMIA, UNSPECIFIED HYPERLIPIDEMIA TYPE: Primary | ICD-10-CM

## 2020-11-30 PROCEDURE — 99214 OFFICE O/P EST MOD 30 MIN: CPT | Performed by: INTERNAL MEDICINE

## 2020-11-30 RX ORDER — GABAPENTIN 300 MG/1
300 CAPSULE ORAL NIGHTLY PRN
Qty: 90 CAPSULE | Refills: 0 | Status: SHIPPED | OUTPATIENT
Start: 2020-11-30 | End: 2020-11-30 | Stop reason: SDUPTHER

## 2020-11-30 RX ORDER — GABAPENTIN 300 MG/1
300 CAPSULE ORAL NIGHTLY
Qty: 90 CAPSULE | Refills: 0 | Status: SHIPPED | OUTPATIENT
Start: 2020-11-30 | End: 2021-02-19

## 2020-11-30 NOTE — TELEPHONE ENCOUNTER
[facility] like [facility] send to the [facility]).

## 2020-11-30 NOTE — PROGRESS NOTES
Subjective   Scout Linda is a 75 y.o. male.     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  Also chronic hemolytic anemia.  History of PSVT.    Hyperlipidemia  This is a chronic problem. The current episode started more than 1 year ago. The problem is controlled. Recent lipid tests were reviewed and are normal. There are no known factors aggravating his hyperlipidemia. Pertinent negatives include no chest pain, myalgias or shortness of breath. Current antihyperlipidemic treatment includes statins. The current treatment provides significant improvement of lipids. There are no compliance problems.  Risk factors for coronary artery disease include dyslipidemia and male sex.   Hyperglycemia  This is a chronic problem. Pertinent negatives include no chest pain, coughing, headaches, myalgias, nausea or vomiting.   Heartburn  He reports no chest pain, no coughing, no nausea or no wheezing. This is a chronic problem. The current episode started more than 1 year ago. The problem occurs rarely. The problem has been unchanged. He has tried a PPI for the symptoms. The treatment provided significant relief.   Anemia  Presents for follow-up visit. There has been no malaise/fatigue.   Osteoarthritis  Presents for follow-up visit. His past medical history is significant for osteoarthritis.        The following portions of the patient's history were reviewed and updated as appropriate: allergies, current medications, past social history and problem list.    Outpatient Medications Marked as Taking for the 11/30/20 encounter (Telemedicine) with Scout Callaway MD   Medication Sig Dispense Refill   • doxazosin (CARDURA) 4 MG tablet Take 4 mg by mouth every night.     • gabapentin (NEURONTIN) 300 MG capsule Take 1 capsule by mouth At Night As Needed (low back pain). 30 capsule 0   •  metoprolol tartrate (LOPRESSOR) 50 MG tablet Take 25 mg by mouth 2 (two) times a day.     • omeprazole (PriLOSEC) 20 MG capsule Take 20 mg by mouth daily.     • sildenafil (VIAGRA) 25 MG tablet Take 1-4 tablets by mouth Daily As Needed for Erectile Dysfunction. 30 tablet 10   • simvastatin (ZOCOR) 40 MG tablet Take 40 mg by mouth daily.     • [DISCONTINUED] finasteride (PROSCAR) 5 MG tablet Take 5 mg by mouth daily.         Review of Systems   Constitutional: Negative for malaise/fatigue.   Respiratory: Negative for cough, shortness of breath and wheezing.    Cardiovascular: Negative for chest pain and leg swelling.   Gastrointestinal: Negative for constipation, nausea and vomiting.   Musculoskeletal: Negative for myalgias.   Neurological: Negative for headaches.       Objective   There were no vitals filed for this visit.   There were no vitals filed for this visit. [unfilled]  There is no height or weight on file to calculate BMI.      Physical Exam   Pulmonary/Chest: Effort normal.   Abdominal: Normal appearance.   Neurological: He is alert.   Psychiatric: His behavior is normal. Mood, judgment and thought content normal.         Problems Addressed this Visit        Cardiovascular and Mediastinum    Hyperlipidemia - Primary       Digestive    GERD (gastroesophageal reflux disease)       Endocrine    IGT (impaired glucose tolerance)       Nervous and Auditory    Low back pain       Hematopoietic and Hemostatic    Other acquired hemolytic anemias (CMS/[facility])      Diagnoses       Codes Comments    Hyperlipidemia, unspecified hyperlipidemia type    -  Primary ICD-10-CM: E78.5  ICD-9-CM: 272.4     Acute bilateral low back pain without sciatica     ICD-10-CM: M54.5  ICD-9-CM: 724.2, 338.19     Gastroesophageal reflux disease, unspecified whether esophagitis present     ICD-10-CM: K21.9  ICD-9-CM: 530.81     IGT (impaired glucose tolerance)     ICD-10-CM: R73.02  ICD-9-CM: 790.22     Other acquired hemolytic anemias  (CMS/[facility])     ICD-10-CM: D59.8  ICD-9-CM: 283.2         Assessment/Plan    In for recheck of lipids, GERD, Igt, and OA.  GERD is well controlled on Prilosec daily.  Tolerating lipid treatment well.  Blood pressure controls excellent.  Lipid control is excellent.  Impaired glucose tolerance is about the same.  [facility]ual labs at the [facility] once yearly.  Otherwise stable and looking good.  Will continue monitor lipids, glu, a1c, and cbc q 6 mos. 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  That was on his lower neck or upper back.  He has a chronic hemolytic anemia that has not been a problem.  Stable.  Discussed the need to avoid dehydration and NSAIDs regarding his CKD.  History of PSVT stable.  Annual wellness visiy due now December 2020.  He has had some insomnia since wife passed away.  He is using gabapentin with excellent results.  Continue to monitor every 6 months.         Dragon disclaimer:   Much of this encounter note is an electronic transcription/translation of spoken language to printed text. The electronic translation of spoken language may permit erroneous, or at times, nonsensical words or phrases to be inadvertently transcribed; Although I have reviewed the note for such errors, some may still exist.

## 2020-12-01 ENCOUNTER — OFFICE VISIT (OUTPATIENT)
Dept: INTERNAL MEDICINE | Facility: CLINIC | Age: 75
End: 2020-12-01

## 2020-12-01 VITALS
WEIGHT: 191.4 LBS | HEIGHT: 70 IN | BODY MASS INDEX: 27.4 KG/M2 | TEMPERATURE: 96.8 F | DIASTOLIC BLOOD PRESSURE: 78 MMHG | RESPIRATION RATE: 16 BRPM | HEART RATE: 71 BPM | SYSTOLIC BLOOD PRESSURE: 142 MMHG | OXYGEN SATURATION: 99 %

## 2020-12-01 DIAGNOSIS — Z00.00 ENCOUNTER FOR MEDICARE ANNUAL WELLNESS EXAM: Primary | ICD-10-CM

## 2020-12-01 PROCEDURE — G0439 PPPS, SUBSEQ VISIT: HCPCS | Performed by: INTERNAL MEDICINE

## 2020-12-01 NOTE — PROGRESS NOTES
The ABCs of the Annual Wellness Visit  Subsequent Medicare Wellness Visit    Chief Complaint   Patient presents with   • Annual Exam       Subjective   History of Present Illness:  Scout Linda is a 75 y.o. male who presents for a Subsequent Medicare Wellness Visit.    HEALTH RISK ASSESSMENT    Recent Hospitalizations:  No hospitalization(s) within the last year.    Current Medical Providers:  Patient Care Team:  Scout Callaway MD as PCP - General (Internal Medicine)  Scout Callaway MD as PCP - Internal Medicine (Internal Medicine)    Smoking Status:  Social History     Tobacco Use   Smoking Status Former Smoker   • Packs/day: 0.50   • Years: 11.00   • Pack years: 5.50   • Types: Cigarettes   • Start date:    • Quit date: 1987   • Years since quittin.0   Smokeless Tobacco Never Used       Alcohol Consumption:  Social History     Substance and Sexual Activity   Alcohol Use Yes   • Alcohol/week: 3.0 standard drinks   • Types: 3 Cans of beer per week   • Frequency: 2-3 times a week   • Drinks per session: 1 or 2   • Binge frequency: Never    Comment: 3 beers per week       Depression Screen:   PHQ-2/PHQ-9 Depression Screening 2020   Little interest or pleasure in doing things 0   Feeling down, depressed, or hopeless 1   Trouble falling or staying asleep, or sleeping too much 0   Feeling tired or having little energy 0   Poor appetite or overeating 0   Feeling bad about yourself - or that you are a failure or have let yourself or your family down 0   Trouble concentrating on things, such as reading the newspaper or watching television 0   Moving or speaking so slowly that other people could have noticed. Or the opposite - being so fidgety or restless that you have been moving around a lot more than usual 0   Thoughts that you would be better off dead, or of hurting yourself in some way 0   Total Score 1   If you checked off any problems, how difficult have these problems made it for you to do your  work, take care of things at home, or get along with other people? Not difficult at all       Fall Risk Screen:  EDYTA Fall Risk Assessment was completed, and patient is at LOW risk for falls.Assessment completed on:12/1/2020    Health Habits and Functional and Cognitive Screening:  Functional & Cognitive Status 12/1/2020   Do you have difficulty preparing food and eating? No   Do you have difficulty bathing yourself, getting dressed or grooming yourself? No   Do you have difficulty using the toilet? No   Do you have difficulty moving around from place to place? No   Do you have trouble with steps or getting out of a bed or a chair? No   Current Diet Well Balanced Diet   Dental Exam Up to date   Eye Exam Up to date   Exercise (times per week) 3 times per week   Current Exercises Include Walking   Current Exercise Activities Include -   Do you need help using the phone?  No   Are you deaf or do you have serious difficulty hearing?  Yes   Do you need help with transportation? No   Do you need help shopping? No   Do you need help preparing meals?  No   Do you need help with housework?  No   Do you need help with laundry? No   Do you need help taking your medications? No   Do you need help managing money? No   Do you ever drive or ride in a car without wearing a seat belt? No   Have you felt unusual stress, anger or loneliness in the last month? Yes   Who do you live with? Other   If you need help, do you have trouble finding someone available to you? No   Have you been bothered in the last four weeks by sexual problems? No   Do you have difficulty concentrating, remembering or making decisions? No         Does the patient have evidence of cognitive impairment? No    Asprin use counseling:Does not need ASA (and currently is not on it)    Age-appropriate Screening Schedule:  Refer to the list below for future screening recommendations based on patient's age, sex and/or medical conditions. Orders for these recommended  tests are listed in the plan section. The patient has been provided with a written plan.    Health Maintenance   Topic Date Due   • LIPID PANEL  11/23/2021   • COLONOSCOPY  10/01/2024   • TDAP/TD VACCINES (2 - Td) 01/26/2027   • INFLUENZA VACCINE  Completed   • ZOSTER VACCINE  Completed          The following portions of the patient's history were reviewed and updated as appropriate: allergies, current medications, past family history, past medical history, past social history, past surgical history and problem list.    Outpatient Medications Prior to Visit   Medication Sig Dispense Refill   • doxazosin (CARDURA) 4 MG tablet Take 4 mg by mouth every night.     • gabapentin (NEURONTIN) 300 MG capsule Take 1 capsule by mouth Every Night. 90 capsule 0   • metoprolol tartrate (LOPRESSOR) 50 MG tablet Take 25 mg by mouth 2 (two) times a day.     • omeprazole (PriLOSEC) 20 MG capsule Take 20 mg by mouth daily.     • sildenafil (VIAGRA) 25 MG tablet Take 1-4 tablets by mouth Daily As Needed for Erectile Dysfunction. 30 tablet 10   • simvastatin (ZOCOR) 40 MG tablet Take 40 mg by mouth daily.       No facility-administered medications prior to visit.        Patient Active Problem List   Diagnosis   • Low back pain   • PAT (paroxysmal atrial tachycardia) (CMS/HCC)   • GERD (gastroesophageal reflux disease)   • Polyp of colon, adenomatous   • IGT (impaired glucose tolerance)   • BPH (benign prostatic hypertrophy)   • Hyperlipidemia   • Spindle cell liposarcoma (CMS/HCC)   • Thrombocytopenia (CMS/HCC)   • Other acquired hemolytic anemias (CMS/HCC)   • CKD (chronic kidney disease) stage 3, GFR 30-59 ml/min   • Other male erectile dysfunction       Advanced Care Planning:  ACP discussion was held with the patient during this visit. Patient has an advance directive (not in EMR), copy requested.    Review of Systems    Compared to one year ago, the patient feels his physical health is better.  Compared to one year ago, the patient  "feels his mental health is the same.    Reviewed chart for potential of high risk medication in the elderly: yes  Reviewed chart for potential of harmful drug interactions in the elderly:yes    Objective         Vitals:    12/01/20 1104   BP: 142/78   Pulse: 71   Resp: 16   Temp: 96.8 °F (36 °C)   TempSrc: Infrared   SpO2: 99%   Weight: 86.8 kg (191 lb 6.4 oz)   Height: 177.8 cm (70\")   PainSc: 0-No pain       Body mass index is 27.46 kg/m².  Discussed the patient's BMI with him. The BMI is above average; BMI management plan is completed.    Physical Exam    Lab Results   Component Value Date    CHLPL 131 11/23/2020    TRIG 102 11/23/2020    HDL 54 11/23/2020    LDL 58 11/23/2020    VLDL 19 11/23/2020    HGBA1C 6.4 (H) 11/23/2020        Assessment/Plan   Medicare Risks and Personalized Health Plan  CMS Preventative Services Quick Reference  Advance Directive Discussion  Fall Risk  Hearing Problem  Obesity/Overweight     The above risks/problems have been discussed with the patient.  Pertinent information has been shared with the patient in the After Visit Summary.  Follow up plans and orders are seen below in the Assessment/Plan Section.    There are no diagnoses linked to this encounter.  Follow Up:  No follow-ups on file.     An After Visit Summary and PPPS were given to the patient.             "

## 2020-12-01 NOTE — PATIENT INSTRUCTIONS
Hearing Loss  Hearing loss is a partial or total loss of the ability to hear. This can be temporary or permanent, and it can happen in one or both ears.  Medical care is necessary to treat hearing loss properly and to prevent the condition from getting worse. Your hearing may partially or completely come back, depending on what caused your hearing loss and how severe it is. In some cases, hearing loss is permanent.  What are the causes?  Common causes of hearing loss include:  · Too much wax in the ear canal.  · Infection of the ear canal or middle ear.  · Fluid in the middle ear.  · Injury to the ear or surrounding area.  · An object stuck in the ear.  · A history of prolonged exposure to loud sounds, such as music.  Less common causes of hearing loss include:  · Tumors in the ear.  · Viral or bacterial infections, such as meningitis.  · A hole in the eardrum (perforated eardrum).  · Problems with the hearing nerve that sends signals between the brain and the ear.  · Certain medicines.  What are the signs or symptoms?  Symptoms of this condition may include:  · Difficulty telling the difference between sounds.  · Difficulty following a conversation when there is background noise.  · Lack of response to sounds in your environment. This may be most noticeable when you do not respond to startling sounds.  · Needing to turn up the volume on the television, radio, or other devices.  · Ringing in the ears.  · Dizziness.  How is this diagnosed?  This condition is diagnosed based on:  · A physical exam.  · A hearing test (audiometry). The audiometry test will be performed by a hearing specialist (audiologist).  You may also be referred to an ear, nose, and throat (ENT) specialist (otolaryngologist).  How is this treated?  Treatment for hearing loss may include:  · Ear wax removal.  · Medicines to treat or prevent infection (antibiotics).  · Medicines to reduce inflammation (corticosteroids).  · Hearing aids for hearing  loss related to nerve damage.  Follow these instructions at home:  · If you were prescribed an antibiotic medicine, take it as told by your health care provider. Do not stop taking the antibiotic even if you start to feel better.  · Take over-the-counter and prescription medicines only as told by your health care provider.  · Avoid loud noises.  · Return to your normal activities as told by your health care provider. Ask your health care provider what activities are safe for you.  · Keep all follow-up visits as told by your health care provider. This is important.  Contact a health care provider if:  · You feel dizzy.  · You develop new symptoms.  · You vomit or feel nauseous.  · You have a fever.  Get help right away if:  · You develop sudden changes in your vision.  · You have severe ear pain.  · You have new or increased weakness.  · You have a severe headache.  Summary  · Hearing loss is a decreased ability to hear sounds around you. It can be temporary or permanent.  · Treatment will depend on the cause of your hearing loss. It may include ear wax removal, medicines, or a hearing aid.  · Your hearing may partially or completely come back, depending on what caused your hearing loss and how severe it is.  · Keep all follow-up visits as told by your health care provider. This is important.  This information is not intended to replace advice given to you by your health care provider. Make sure you discuss any questions you have with your health care provider.  Document Revised: 09/17/2019 Document Reviewed: 09/17/2019  [employer] Patient Education © 2020 [employer]  Fall Prevention in the Home, Adult  Falls can cause injuries and can affect people from all age groups. There are many simple things that you can do to make your home safe and to help prevent falls. Ask for help when making these changes, if needed.  What actions can I take to prevent falls?  General instructions  · Use good lighting in all rooms.  Replace any light bulbs that burn out.  · Turn on lights if it is dark. Use night-lights.  · Place frequently used items in easy-to-reach places. Lower the shelves around your home if necessary.  · Set up furniture so that there are clear paths around it. Avoid moving your furniture around.  · Remove throw rugs and other tripping hazards from the floor.  · Avoid walking on wet floors.  · Fix any uneven floor surfaces.  · Add color or contrast paint or tape to grab bars and handrails in your home. Place contrasting color strips on the first and last steps of stairways.  · When you use a stepladder, make sure that it is completely opened and that the sides are firmly locked. Have someone hold the ladder while you are using it. Do not climb a closed stepladder.  · Be aware of any and all pets.  What can I do in the bathroom?         · Keep the floor dry. Immediately clean up any water that spills onto the floor.  · Remove soap buildup in the tub or shower on a regular basis.  · Use non-skid mats or decals on the floor of the tub or shower.  · Attach bath mats securely with double-sided, non-slip rug tape.  · If you need to sit down while you are in the shower, use a plastic, non-slip stool.  · Install grab bars by the toilet and in the tub and shower. Do not use towel bars as grab bars.  What can I do in the bedroom?  · Make sure that a bedside light is easy to reach.  · Do not use oversized bedding that drapes onto the floor.  · Have a firm chair that has side arms to use for getting dressed.  What can I do in the kitchen?  · Clean up any spills right away.  · If you need to reach for something above you, use a sturdy step stool that has a grab bar.  · Keep electrical cables out of the way.  · Do not use floor polish or wax that makes floors slippery. If you must use wax, make sure that it is non-skid floor wax.  What can I do in the stairways?  · Do not leave any items on the stairs.  · Make sure that you have a  light switch at the top of the stairs and the bottom of the stairs. Have them installed if you do not have them.  · Make sure that there are handrails on both sides of the stairs. Fix handrails that are broken or loose. Make sure that handrails are as long as the stairways.  · Install non-slip stair treads on all stairs in your home.  · Avoid having throw rugs at the top or bottom of stairways, or secure the rugs with carpet tape to prevent them from moving.  · Choose a carpet design that does not hide the edge of steps on the stairway.  · Check any carpeting to make sure that it is firmly attached to the stairs. Fix any carpet that is loose or worn.  What can I do on the outside of my home?  · Use bright outdoor lighting.  · Regularly repair the edges of walkways and driveways and fix any cracks.  · Remove high doorway thresholds.  · Trim any shrubbery on the main path into your home.  · Regularly check that handrails are securely fastened and in good repair. Both sides of any steps should have handrails.  · Install guardrails along the edges of any raised decks or porches.  · Clear walkways of debris and clutter, including tools and rocks.  · Have leaves, snow, and ice cleared regularly.  · Use sand or salt on walkways during winter months.  · In the garage, clean up any spills right away, including grease or oil spills.  What other actions can I take?  · Wear closed-toe shoes that fit well and support your feet. Wear shoes that have rubber soles or low heels.  · Use mobility aids as needed, such as canes, walkers, scooters, and crutches.  · Review your medicines with your health care provider. Some medicines can cause dizziness or changes in blood pressure, which increase your risk of falling.  Talk with your health care provider about other ways that you can decrease your risk of falls. This may include working with a physical therapist or  to improve your strength, balance, and endurance.  Where to find  more information  · Centers for Disease Control and Prevention, [place]: https://www.cdc.gov  · National [place] on Aging: https://kg8hchl.jamie.nih.gov  Contact a health care provider if:  · You are afraid of falling at home.  · You feel weak, drowsy, or dizzy at home.  · You fall at home.  Summary  · There are many simple things that you can do to make your home safe and to help prevent falls.  · Ways to make your home safe include removing tripping hazards and installing grab bars in the bathroom.  · Ask for help when making these changes in your home.  This information is not intended to replace advice given to you by your health care provider. Make sure you discuss any questions you have with your health care provider.  Document Revised: 11/30/2018 Document Reviewed: 08/02/2018  [employer] Patient Education © 2020 [employer]  Exercising to Stay Healthy  To become healthy and stay healthy, it is recommended that you do moderate-intensity and vigorous-intensity exercise. You can tell that you are exercising at a moderate intensity if your heart starts beating faster and you start breathing faster but can still hold a conversation. You can tell that you are exercising at a vigorous intensity if you are breathing much harder and faster and cannot hold a conversation while exercising.  Exercising regularly is important. It has many health benefits, such as:  · Improving overall fitness, flexibility, and endurance.  · Increasing bone density.  · Helping with weight control.  · Decreasing body fat.  · Increasing muscle strength.  · Reducing stress and tension.  · Improving overall health.  How often should I exercise?  Choose an activity that you enjoy, and set realistic goals. Your health care provider can help you make an activity plan that works for you.  Exercise regularly as told by your health care provider. This may include:  · Doing strength training two times a week, such as:  ? Lifting weights.  ? Using  resistance bands.  ? Push-ups.  ? Sit-ups.  ? Yoga.  · Doing a certain intensity of exercise for a given amount of time. Choose from these options:  ? A total of 150 minutes of moderate-intensity exercise every week.  ? A total of 75 minutes of vigorous-intensity exercise every week.  ? A mix of moderate-intensity and vigorous-intensity exercise every week.  Children, pregnant women, people who have not exercised regularly, people who are overweight, and older adults may need to talk with a health care provider about what activities are safe to do. If you have a medical condition, be sure to talk with your health care provider before you start a new exercise program.  What are some exercise ideas?  Moderate-intensity exercise ideas include:  · Walking 1 mile (1.6 km) in about 15 minutes.  · Biking.  · Hiking.  · Golfing.  · Dancing.  · Water aerobics.  Vigorous-intensity exercise ideas include:  · Walking 4.5 miles (7.2 km) or more in about 1 hour.  · Jogging or running 5 miles (8 km) in about 1 hour.  · Biking 10 miles (16.1 km) or more in about 1 hour.  · Lap swimming.  · Roller-skating or in-line skating.  · Cross-country skiing.  · Vigorous competitive sports, such as football, basketball, and soccer.  · Jumping rope.  · Aerobic dancing.  What are some everyday activities that can help me to get exercise?  · Yard work, such as:  ? Pushing a .  ? Raking and bagging leaves.  · Washing your car.  · Pushing a stroller.  · Shoveling snow.  · Gardening.  · Washing windows or floors.  How can I be more active in my day-to-day activities?  · Use stairs instead of an elevator.  · Take a walk during your lunch break.  · If you drive, park your car farther away from your work or school.  · If you take public transportation, get off one stop early and walk the rest of the way.  · Stand up or walk around during all of your indoor phone calls.  · Get up, stretch, and walk around every 30 minutes throughout the  day.  · Enjoy exercise with a friend. Support to continue exercising will help you keep a regular routine of activity.  What guidelines can I follow while exercising?  · Before you start a new exercise program, talk with your health care provider.  · Do not exercise so much that you hurt yourself, feel dizzy, or get very short of breath.  · Wear comfortable clothes and wear shoes with good support.  · Drink plenty of water while you exercise to prevent dehydration or heat stroke.  · Work out until your breathing and your heartbeat get faster.  Where to find more information  · [place] Department of Health and Human Services: www.hhs.gov  · Centers for Disease Control and Prevention (CDC): www.cdc.gov  Summary  · Exercising regularly is important. It will improve your overall fitness, flexibility, and endurance.  · Regular exercise also will improve your overall health. It can help you control your weight, reduce stress, and improve your bone density.  · Do not exercise so much that you hurt yourself, feel dizzy, or get very short of breath.  · Before you start a new exercise program, talk with your health care provider.  This information is not intended to replace advice given to you by your health care provider. Make sure you discuss any questions you have with your health care provider.  Document Revised: 11/30/2018 Document Reviewed: 11/08/2018  [employer] Patient Education © 2020 [employer]  Calorie Counting for Weight Loss  Calories are units of energy. Your body needs a certain amount of calories from food to keep you going throughout the day. When you eat more calories than your body needs, your body stores the extra calories as fat. When you eat fewer calories than your body needs, your body burns fat to get the energy it needs.  Calorie counting means keeping track of how many calories you eat and drink each day. Calorie counting can be helpful if you need to lose weight. If you make sure to eat fewer  calories than your body needs, you should lose weight. Ask your health care provider what a healthy weight is for you.  For calorie counting to work, you will need to eat the right number of calories in a day in order to lose a healthy amount of weight per week. A dietitian can help you determine how many calories you need in a day and will give you suggestions on how to reach your calorie goal.  · A healthy amount of weight to lose per week is usually 1-2 lb (0.5-0.9 kg). This usually means that your daily calorie intake should be reduced by 500-750 calories.  · Eating 1,200 - 1,500 calories per day can help most women lose weight.  · Eating 1,500 - 1,800 calories per day can help most men lose weight.  What is my plan?  My goal is to have __________ calories per day.  If I have this many calories per day, I should lose around __________ pounds per week.  What do I need to know about calorie counting?  In order to meet your daily calorie goal, you will need to:  · Find out how many calories are in each food you would like to eat. Try to do this before you eat.  · Decide how much of the food you plan to eat.  · Write down what you ate and how many calories it had. Doing this is called keeping a food log.  To successfully lose weight, it is important to balance calorie counting with a healthy lifestyle that includes regular activity. Aim for 150 minutes of moderate exercise (such as walking) or 75 minutes of vigorous exercise (such as running) each week.  Where do I find calorie information?    The number of calories in a food can be found on a Nutrition Facts label. If a food does not have a Nutrition Facts label, try to look up the calories online or ask your dietitian for help.  Remember that calories are listed per serving. If you choose to have more than one serving of a food, you will have to multiply the calories per serving by the amount of servings you plan to eat. For example, the label on a package of  "bread might say that a serving size is 1 slice and that there are 90 calories in a serving. If you eat 1 slice, you will have eaten 90 calories. If you eat 2 slices, you will have eaten 180 calories.  How do I keep a food log?  Immediately after each meal, record the following information in your food log:  · What you ate. Don't forget to include toppings, sauces, and other extras on the food.  · How much you ate. This can be measured in cups, ounces, or number of items.  · How many calories each food and drink had.  · The total number of calories in the meal.  Keep your food log near you, such as in a small notebook in your pocket, or use a mobile nikita or website. Some programs will calculate calories for you and show you how many calories you have left for the day to meet your goal.  What are some calorie counting tips?    · Use your calories on foods and drinks that will fill you up and not leave you hungry:  ? Some examples of foods that fill you up are nuts and nut butters, vegetables, lean proteins, and high-fiber foods like whole grains. High-fiber foods are foods with more than 5 g fiber per serving.  ? Drinks such as sodas, specialty coffee drinks, alcohol, and juices have a lot of calories, yet do not fill you up.  · Eat nutritious foods and avoid empty calories. Empty calories are calories you get from foods or beverages that do not have many vitamins or protein, such as candy, sweets, and soda. It is better to have a nutritious high-calorie food (such as an avocado) than a food with few nutrients (such as a bag of chips).  · Know how many calories are in the foods you eat most often. This will help you calculate calorie counts faster.  · Pay attention to calories in drinks. Low-calorie drinks include water and unsweetened drinks.  · Pay attention to nutrition labels for \"low fat\" or \"fat free\" foods. These foods sometimes have the same amount of calories or more calories than the full fat versions. They " also often have added sugar, starch, or salt, to make up for flavor that was removed with the fat.  · Find a way of tracking calories that works for you. Get creative. Try different apps or programs if writing down calories does not work for you.  What are some portion control tips?  · Know how many calories are in a serving. This will help you know how many servings of a certain food you can have.  · Use a measuring cup to measure serving sizes. You could also try weighing out portions on a kitchen scale. With time, you will be able to estimate serving sizes for some foods.  · Take some time to put servings of different foods on your favorite plates, bowls, and cups so you know what a serving looks like.  · Try not to eat straight from a bag or box. Doing this can lead to overeating. Put the amount you would like to eat in a cup or on a plate to make sure you are eating the right portion.  · Use smaller plates, glasses, and bowls to prevent overeating.  · Try not to multitask (for example, watch TV or use your computer) while eating. If it is time to eat, sit down at a table and enjoy your food. This will help you to know when you are full. It will also help you to be aware of what you are eating and how much you are eating.  What are tips for following this plan?  Reading food labels  · Check the calorie count compared to the serving size. The serving size may be smaller than what you are used to eating.  · Check the source of the calories. Make sure the food you are eating is high in vitamins and protein and low in saturated and trans fats.  Shopping  · Read nutrition labels while you shop. This will help you make healthy decisions before you decide to purchase your food.  · Make a grocery list and stick to it.  Cooking  · Try to cook your favorite foods in a healthier way. For example, try baking instead of frying.  · Use low-fat dairy products.  Meal planning  · Use more fruits and vegetables. Half of your  "plate should be fruits and vegetables.  · Include lean proteins like poultry and fish.  How do I count calories when eating out?  · Ask for smaller portion sizes.  · Consider sharing an entree and sides instead of getting your own entree.  · If you get your own entree, eat only half. Ask for a box at the beginning of your meal and put the rest of your entree in it so you are not tempted to eat it.  · If calories are listed on the menu, choose the lower calorie options.  · Choose dishes that include vegetables, fruits, whole grains, low-fat dairy products, and lean protein.  · Choose items that are boiled, broiled, grilled, or steamed. Stay away from items that are buttered, battered, fried, or served with cream sauce. Items labeled \"crispy\" are usually fried, unless stated otherwise.  · Choose water, low-fat milk, unsweetened iced tea, or other drinks without added sugar. If you want an alcoholic beverage, choose a lower calorie option such as a glass of wine or light beer.  · Ask for dressings, sauces, and syrups on the side. These are usually high in calories, so you should limit the amount you eat.  · If you want a salad, choose a garden salad and ask for grilled meats. Avoid extra toppings like pleitez, cheese, or fried items. Ask for the dressing on the side, or ask for olive oil and vinegar or lemon to use as dressing.  · Estimate how many servings of a food you are given. For example, a serving of cooked rice is ½ cup or about the size of half a baseball. Knowing serving sizes will help you be aware of how much food you are eating at restaurants. The list below tells you how big or small some common portion sizes are based on everyday objects:  ? 1 oz--4 stacked dice.  ? 3 oz--1 deck of cards.  ? 1 tsp--1 die.  ? 1 Tbsp--½ a ping-pong ball.  ? 2 Tbsp--1 ping-pong ball.  ? ½ cup--½ baseball.  ? 1 cup--1 baseball.  Summary  · Calorie counting means keeping track of how many calories you eat and drink each day. If " you eat fewer calories than your body needs, you should lose weight.  · A healthy amount of weight to lose per week is usually 1-2 lb (0.5-0.9 kg). This usually means reducing your daily calorie intake by 500-750 calories.  · The number of calories in a food can be found on a Nutrition Facts label. If a food does not have a Nutrition Facts label, try to look up the calories online or ask your dietitian for help.  · Use your calories on foods and drinks that will fill you up, and not on foods and drinks that will leave you hungry.  · Use smaller plates, glasses, and bowls to prevent overeating.  This information is not intended to replace advice given to you by your health care provider. Make sure you discuss any questions you have with your health care provider.  Document Revised: 09/06/2019 Document Reviewed: 11/17/2017  [email or web address] Patient Education © 2020 [email or web address]  BMI for Adults  What is BMI?  Body mass index (BMI) is a number that is calculated from a person's weight and height. BMI can help estimate how much of a person's weight is composed of fat. BMI does not measure body fat directly. Rather, it is an alternative to procedures that directly measure body fat, which can be difficult and expensive.  BMI can help identify people who may be at higher risk for certain medical problems.  What are BMI measurements used for?  BMI is used as a screening tool to identify possible weight problems. It helps determine whether a person is obese, overweight, a healthy weight, or underweight.  BMI is useful for:  · Identifying a weight problem that may be related to a medical condition or may increase the risk for medical problems.  · Promoting changes, such as changes in diet and exercise, to help reach a healthy weight. BMI screening can be repeated to see if these changes are working.  How is BMI calculated?  BMI involves measuring your weight in relation to your height. Both height and weight are measured, and the BMI  "is calculated from those numbers. This can be done either in English ([place] or metric measurements. Note that charts and online BMI calculators are available to help you find your BMI quickly and easily without having to do these calculations yourself.  To calculate your BMI in English ([place] measurements:    1. Measure your weight in pounds (lb).  2. Multiply the number of pounds by 703.  ? For example, for a person who weighs 180 lb, multiply that number by 703, which equals [phone].  3. Measure your height in inches. Then multiply that number by itself to get a measurement called \"inches squared.\"  ? For example, for a person who is 70 inches tall, the \"inches squared\" measurement is 70 inches x 70 inches, which equals 4,900 inches squared.  4. Divide the total from step 2 (number of lb x 703) by the total from step 3 (inches squared): 126,540 ÷ 4,900 = 25.8. This is your BMI.  To calculate your BMI in metric measurements:  1. Measure your weight in kilograms (kg).  2. Measure your height in meters (m). Then multiply that number by itself to get a measurement called \"meters squared.\"  ? For example, for a person who is 1.75 m tall, the \"meters squared\" measurement is 1.75 m x 1.75 m, which is equal to 3.1 meters squared.  3. Divide the number of kilograms (your weight) by the meters squared number. In this example: 70 ÷ 3.1 = 22.6. This is your BMI.  What do the results mean?  BMI charts are used to identify whether you are underweight, normal weight, overweight, or obese. The following guidelines will be used:  · Underweight: BMI less than 18.5.  · Normal weight: BMI between 18.5 and 24.9.  · Overweight: BMI between 25 and 29.9.  · Obese: BMI of 30 or above.  Keep these notes in mind:  · Weight includes both fat and muscle, so someone with a muscular build, such as an athlete, may have a BMI that is higher than 24.9. In cases like these, BMI is not an accurate measure of body fat.  · To determine if excess body " fat is the cause of a BMI of 25 or higher, further assessments may need to be done by a health care provider.  · BMI is usually interpreted in the same way for men and women.  Where to find more information  For more information about BMI, including tools to quickly calculate your BMI, go to these websites:  · Centers for Disease Control and Prevention: www.cdc.gov  · American Heart Association: www.heart.org  · National Heart, Lung, and Blood [place]: www.nhlbi.nih.gov  Summary  · Body mass index (BMI) is a number that is calculated from a person's weight and height.  · BMI may help estimate how much of a person's weight is composed of fat. BMI can help identify those who may be at higher risk for certain medical problems.  · BMI can be measured using English measurements or metric measurements.  · BMI charts are used to identify whether you are underweight, normal weight, overweight, or obese.  This information is not intended to replace advice given to you by your health care provider. Make sure you discuss any questions you have with your health care provider.  Document Revised: 09/09/2020 Document Reviewed: 07/17/2020  [employer] Patient Education © 2020 [employer]  Advance Directive    Advance directives are legal documents that let you make choices ahead of time about your health care and medical treatment in case you become unable to communicate for yourself. Advance directives are a way for you to make known your wishes to family, friends, and health care providers. This can let others know about your end-of-life care if you become unable to communicate.  Discussing and writing advance directives should happen over time rather than all at once. Advance directives can be changed depending on your situation and what you want, even after you have signed the advance directives.  There are different types of advance directives, such as:  · Medical power of .  · Living will.  · Do not resuscitate (DNR)  or do not attempt resuscitation (DNAR) order.  Health care proxy and medical power of   A health care proxy is also called a health care agent. This is a person who is appointed to make medical decisions for you in cases where you are unable to make the decisions yourself. Generally, people choose someone they know well and trust to represent their preferences. Make sure to ask this person for an agreement to act as your proxy. A proxy may have to exercise judgment in the event of a medical decision for which your wishes are not known.  A medical power of  is a legal document that names your health care proxy. Depending on the laws in your state, after the document is written, it may also need to be:  · Signed.  · Notarized.  · Dated.  · Copied.  · Witnessed.  · Incorporated into your medical record.  You may also want to appoint someone to manage your money in a situation in which you are unable to do so. This is called a durable power of  for finances. It is a separate legal document from the durable power of  for health care. You may choose the same person or someone different from your health care proxy to act as your agent in money matters.  If you do not appoint a proxy, or if there is a concern that the proxy is not acting in your best interests, a court may appoint a guardian to act on your behalf.  Living will  A living will is a set of instructions that state your wishes about medical care when you cannot express them yourself. Health care providers should keep a copy of your living will in your medical record. You may want to give a copy to family members or friends. To alert caregivers in case of an emergency, you can place a card in your wallet to let them know that you have a living will and where they can find it. A living will is used if you become:  · Terminally ill.  · Disabled.  · Unable to communicate or make decisions.  Items to consider in your living will  include:  · To use or not to use life-support equipment, such as dialysis machines and breathing machines (ventilators).  · A DNR or DNAR order. This tells health care providers not to use cardiopulmonary resuscitation (CPR) if breathing or heartbeat stops.  · To use or not to use tube feeding.  · To be given or not to be given food and fluids.  · Comfort (palliative) care when the goal becomes comfort rather than a cure.  · Donation of organs and tissues.  A living will does not give instructions for distributing your money and property if you should pass away.  DNR or DNAR  A DNR or DNAR order is a request not to have CPR in the event that your heart stops beating or you stop breathing. If a DNR or DNAR order has not been made and shared, a health care provider will try to help any patient whose heart has stopped or who has stopped breathing. If you plan to have surgery, talk with your health care provider about how your DNR or DNAR order will be followed if problems occur.  What if I do not have an advance directive?  If you do not have an advance directive, some states assign family decision makers to act on your behalf based on how closely you are related to them. Each state has its own laws about advance directives. You may want to check with your health care provider, , or state representative about the laws in your state.  Summary  · Advance directives are the legal documents that allow you to make choices ahead of time about your health care and medical treatment in case you become unable to tell others about your care.  · The process of discussing and writing advance directives should happen over time. You can change the advance directives, even after you have signed them.  · Advance directives include DNR or DNAR orders, living kruger, and designating an agent as your medical power of .  This information is not intended to replace advice given to you by your health care provider. Make sure  you discuss any questions you have with your health care provider.  Document Revised: 07/16/2020 Document Reviewed: 07/16/2020  Elsevier Patient Education © 2020 [employer]

## 2021-02-19 DIAGNOSIS — M54.50 ACUTE BILATERAL LOW BACK PAIN WITHOUT SCIATICA: ICD-10-CM

## 2021-02-19 RX ORDER — GABAPENTIN 300 MG/1
300 CAPSULE ORAL
Qty: 90 CAPSULE | Refills: 0 | Status: SHIPPED | OUTPATIENT
Start: 2021-02-19 | End: 2021-02-22 | Stop reason: SDUPTHER

## 2021-02-19 RX ORDER — GABAPENTIN 300 MG/1
CAPSULE ORAL
Qty: 90 CAPSULE | Refills: 0 | Status: SHIPPED | OUTPATIENT
Start: 2021-02-19 | End: 2021-02-19 | Stop reason: SDUPTHER

## 2021-02-22 RX ORDER — GABAPENTIN 300 MG/1
300 CAPSULE ORAL
Qty: 90 CAPSULE | Refills: 0 | Status: SHIPPED | OUTPATIENT
Start: 2021-02-22 | End: 2021-04-20 | Stop reason: SDUPTHER

## 2021-04-12 DIAGNOSIS — M54.50 ACUTE BILATERAL LOW BACK PAIN WITHOUT SCIATICA: ICD-10-CM

## 2021-04-12 RX ORDER — GABAPENTIN 300 MG/1
CAPSULE ORAL
Qty: 90 CAPSULE | Refills: 0 | OUTPATIENT
Start: 2021-04-12

## 2021-04-20 DIAGNOSIS — M54.50 ACUTE BILATERAL LOW BACK PAIN WITHOUT SCIATICA: ICD-10-CM

## 2021-04-20 RX ORDER — GABAPENTIN 300 MG/1
600 CAPSULE ORAL
Qty: 180 CAPSULE | Refills: 0 | Status: SHIPPED | OUTPATIENT
Start: 2021-04-20 | End: 2021-07-19

## 2021-04-20 RX ORDER — GABAPENTIN 300 MG/1
600 CAPSULE ORAL
Qty: 180 CAPSULE | Refills: 0 | Status: SHIPPED | OUTPATIENT
Start: 2021-04-20 | End: 2021-04-20 | Stop reason: SDUPTHER

## 2021-04-20 NOTE — TELEPHONE ENCOUNTER
Caller: Scout Linda    Relationship: Self    Best call back number: [phone]    Medication needed:   Requested Prescriptions     Pending Prescriptions Disp Refills   • gabapentin (NEURONTIN) 300 MG capsule 90 capsule 0     Sig: Take 1 capsule by mouth every night at bedtime.       When do you need the refill by: ASAP    What additional details did the patient provide when requesting the medication: PATIENT IS REQUESTING PRESCRIPTION EARLY BECAUSE HE HAS BEEN TAKING DOUBLE THE DOSAGE RECOMMENDATION. DR FELTON STATED TO ADVISE HIM IF DOSAGE NEEDS TO BE ADJUSTED. PLEASE INCREASE/ADJUST AND REFILL. PATIENT HAS 6 DOSES LEFT.     Does the patient have less than a 3 day supply:  [] Yes  [] No    What is the patient's preferred pharmacy: [address] - [phone] [facility] [phone] FX

## 2021-04-20 NOTE — TELEPHONE ENCOUNTER
Medication is attached pending approval.    Patient was prescribed Gabapentin 300 mg nightly at bedtime 6/2020 for back pain. Last refill was 2/2/21 for a 90 day supply. Patient has been taking 600 mg nightly which is why he is needing an early refill. Please advise.     Last office visit: 12/1/20  Next office visit: 5/26/21  Last refill: 2/22/21

## 2021-05-19 ENCOUNTER — TELEPHONE (OUTPATIENT)
Dept: INTERNAL MEDICINE | Facility: CLINIC | Age: 76
End: 2021-05-19

## 2021-05-19 DIAGNOSIS — R73.02 IGT (IMPAIRED GLUCOSE TOLERANCE): Primary | ICD-10-CM

## 2021-05-19 DIAGNOSIS — D59.8 OTHER ACQUIRED HEMOLYTIC ANEMIAS (HCC): ICD-10-CM

## 2021-05-19 DIAGNOSIS — E78.5 HYPERLIPIDEMIA, UNSPECIFIED HYPERLIPIDEMIA TYPE: ICD-10-CM

## 2021-05-19 NOTE — TELEPHONE ENCOUNTER
Patient went to [facility] this morning but no orders and said he forgot to call before he went over there.  Pt requests lab orders be put in so he can go tomorrow.

## 2021-05-25 LAB
BASOPHILS # BLD AUTO: 0 X10E3/UL (ref 0–0.2)
BASOPHILS NFR BLD AUTO: 1 %
CHOLEST SERPL-MCNC: 141 MG/DL (ref 100–199)
CHOLEST/HDLC SERPL: 2.6 RATIO (ref 0–5)
EOSINOPHIL # BLD AUTO: 0.1 X10E3/UL (ref 0–0.4)
EOSINOPHIL NFR BLD AUTO: 2 %
ERYTHROCYTE [DISTWIDTH] IN BLOOD BY AUTOMATED COUNT: 13 % (ref 11.6–15.4)
GLUCOSE SERPL-MCNC: 133 MG/DL (ref 65–99)
HBA1C MFR BLD: 6.1 % (ref 4.8–5.6)
HCT VFR BLD AUTO: 42.9 % (ref 37.5–51)
HDLC SERPL-MCNC: 55 MG/DL
HGB BLD-MCNC: 14.3 G/DL (ref 13–17.7)
IMM GRANULOCYTES # BLD AUTO: 0 X10E3/UL (ref 0–0.1)
IMM GRANULOCYTES NFR BLD AUTO: 1 %
LDLC SERPL CALC-MCNC: 70 MG/DL (ref 0–99)
LYMPHOCYTES # BLD AUTO: 1.1 X10E3/UL (ref 0.7–3.1)
LYMPHOCYTES NFR BLD AUTO: 15 %
MCH RBC QN AUTO: 28.5 PG (ref 26.6–33)
MCHC RBC AUTO-ENTMCNC: 33.3 G/DL (ref 31.5–35.7)
MCV RBC AUTO: 86 FL (ref 79–97)
MONOCYTES # BLD AUTO: 0.8 X10E3/UL (ref 0.1–0.9)
MONOCYTES NFR BLD AUTO: 11 %
NEUTROPHILS # BLD AUTO: 5 X10E3/UL (ref 1.4–7)
NEUTROPHILS NFR BLD AUTO: 70 %
PLATELET # BLD AUTO: 184 X10E3/UL (ref 150–450)
RBC # BLD AUTO: 5.01 X10E6/UL (ref 4.14–5.8)
TRIGL SERPL-MCNC: 81 MG/DL (ref 0–149)
VLDLC SERPL CALC-MCNC: 16 MG/DL (ref 5–40)
WBC # BLD AUTO: 7 X10E3/UL (ref 3.4–10.8)

## 2021-05-26 ENCOUNTER — TELEMEDICINE (OUTPATIENT)
Dept: INTERNAL MEDICINE | Facility: CLINIC | Age: 76
End: 2021-05-26

## 2021-05-26 VITALS — SYSTOLIC BLOOD PRESSURE: 140 MMHG | DIASTOLIC BLOOD PRESSURE: 82 MMHG

## 2021-05-26 DIAGNOSIS — N18.30 STAGE 3 CHRONIC KIDNEY DISEASE, UNSPECIFIED WHETHER STAGE 3A OR 3B CKD (HCC): ICD-10-CM

## 2021-05-26 DIAGNOSIS — I47.1 PAT (PAROXYSMAL ATRIAL TACHYCARDIA) (HCC): ICD-10-CM

## 2021-05-26 DIAGNOSIS — K21.9 GASTROESOPHAGEAL REFLUX DISEASE, UNSPECIFIED WHETHER ESOPHAGITIS PRESENT: ICD-10-CM

## 2021-05-26 DIAGNOSIS — Z79.899 MEDICATION MANAGEMENT: ICD-10-CM

## 2021-05-26 DIAGNOSIS — E78.5 HYPERLIPIDEMIA, UNSPECIFIED HYPERLIPIDEMIA TYPE: Primary | ICD-10-CM

## 2021-05-26 DIAGNOSIS — D59.8 OTHER ACQUIRED HEMOLYTIC ANEMIAS (HCC): ICD-10-CM

## 2021-05-26 DIAGNOSIS — C49.9: ICD-10-CM

## 2021-05-26 DIAGNOSIS — R73.02 IGT (IMPAIRED GLUCOSE TOLERANCE): ICD-10-CM

## 2021-05-26 PROCEDURE — 99214 OFFICE O/P EST MOD 30 MIN: CPT | Performed by: INTERNAL MEDICINE

## 2021-05-26 NOTE — PROGRESS NOTES
Subjective   Scout Linda is a 75 y.o. male.     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  Also chronic hemolytic anemia.  History of PSVT.  Has CKD.  History of PSVT.  History of hemolytic anemia with thrombocytopenia.    Hyperlipidemia  This is a chronic problem. The current episode started more than 1 year ago. The problem is controlled. Recent lipid tests were reviewed and are normal. There are no known factors aggravating his hyperlipidemia. Pertinent negatives include no chest pain or shortness of breath. Current antihyperlipidemic treatment includes statins. The current treatment provides significant improvement of lipids. There are no compliance problems.  Risk factors for coronary artery disease include dyslipidemia and male sex.   Heartburn  He reports no chest pain, no coughing, no nausea or no wheezing. This is a chronic problem. The current episode started more than 1 year ago. The problem occurs rarely. The problem has been unchanged. He has tried a PPI for the symptoms. The treatment provided significant relief.   Anemia  Presents for follow-up visit. There has been no malaise/fatigue.   Hyperglycemia  This is a chronic problem. Pertinent negatives include no chest pain, coughing, nausea or vomiting.        The following portions of the patient's history were reviewed and updated as appropriate: allergies, current medications, past social history and problem list.    Outpatient Medications Marked as Taking for the 5/26/21 encounter (Telemedicine) with Scout Callaway MD   Medication Sig Dispense Refill   • doxazosin (CARDURA) 4 MG tablet Take 4 mg by mouth every night.     • gabapentin (NEURONTIN) 300 MG capsule Take 2 capsules by mouth every night at bedtime. 180 capsule 0   • metoprolol tartrate (LOPRESSOR) 50 MG tablet Take 25 mg by mouth 2 (two) times a day.     •  omeprazole (PriLOSEC) 20 MG capsule Take 20 mg by mouth daily.     • sildenafil (VIAGRA) 25 MG tablet Take 1-4 tablets by mouth Daily As Needed for Erectile Dysfunction. 30 tablet 10       Review of Systems   Constitutional: Negative for malaise/fatigue.   Respiratory: Negative for cough, shortness of breath and wheezing.    Cardiovascular: Negative for chest pain and leg swelling.   Gastrointestinal: Negative for constipation, nausea and vomiting.       Objective   There were no vitals filed for this visit.   There were no vitals filed for this visit. [unfilled]  There is no height or weight on file to calculate BMI.      Physical Exam   Pulmonary/Chest: Effort normal.   Abdominal: Normal appearance.   Neurological: He is alert.   Psychiatric: His behavior is normal. Mood, judgment and thought content normal.         Problems Addressed this Visit        Cardiac and Vasculature    PAT (paroxysmal atrial tachycardia) (CMS/HCC)    Hyperlipidemia - Primary       Endocrine and Metabolic    IGT (impaired glucose tolerance) (Chronic)       Gastrointestinal Abdominal     GERD (gastroesophageal reflux disease) (Chronic)       Genitourinary and Reproductive     CKD (chronic kidney disease) stage 3, GFR 30-59 ml/min (CMS/HCC) (Chronic)       Hematology and Neoplasia    Other acquired hemolytic anemias (CMS/HCC) (Chronic)    Spindle cell liposarcoma (CMS/HCC)      Diagnoses       Codes Comments    Hyperlipidemia, unspecified hyperlipidemia type    -  Primary ICD-10-CM: E78.5  ICD-9-CM: 272.4     IGT (impaired glucose tolerance)     ICD-10-CM: R73.02  ICD-9-CM: 790.22     Gastroesophageal reflux disease, unspecified whether esophagitis present     ICD-10-CM: K21.9  ICD-9-CM: 530.81     Other acquired hemolytic anemias (CMS/HCC)     ICD-10-CM: D59.8  ICD-9-CM: 283.2     Spindle cell liposarcoma (CMS/HCC)     ICD-10-CM: C49.9  ICD-9-CM: 171.9     PAT (paroxysmal atrial tachycardia) (CMS/HCC)     ICD-10-CM: I47.1  ICD-9-CM:  427.0     Stage 3 chronic kidney disease, unspecified whether stage 3a or 3b CKD (CMS/[facility])     ICD-10-CM: N18.30  ICD-9-CM: 585.3         Assessment/Plan    In for recheck of lipids, GERD, Igt, and OA.  GERD is well controlled on Prilosec daily.  Tolerating lipid treatment well.  Blood pressure control is excellent.  Lipid control is excellent.  Impaired glucose tolerance is about the same.  [facility]ual labs at the [facility] once yearly.  Otherwise stable and looking good.  Will continue monitor lipids, glu, a1c, and cbc q 6 mos. 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  That was on his lower neck or upper back.  He has a chronic hemolytic anemia that has not been a problem.  Stable.  Discussed the need to avoid dehydration and NSAIDs regarding his CKD.  History of PSVT stable.  Annual wellness visiy due December 2021.  He has had some insomnia since wife passed away.  He is using gabapentin with excellent results.  Continue to monitor every 6 months.    The above information was reviewed again today 05/26/21.  It continues to be accurate as reflected above and is unchanged.  History, physical and review of systems all reviewed and are unchanged.  Medications were reviewed today and continue the current dosing.         Dragon disclaimer:   Much of this encounter note is an electronic transcription/translation of spoken language to printed text. The electronic translation of spoken language may permit erroneous, or at times, nonsensical words or phrases to be inadvertently transcribed; Although I have reviewed the note for such errors, some may still exist.

## 2021-06-30 RX ORDER — SILDENAFIL 25 MG/1
TABLET, FILM COATED ORAL
Qty: 30 TABLET | Refills: 1 | Status: SHIPPED | OUTPATIENT
Start: 2021-06-30 | End: 2021-08-04

## 2021-07-19 DIAGNOSIS — M54.50 ACUTE BILATERAL LOW BACK PAIN WITHOUT SCIATICA: ICD-10-CM

## 2021-07-19 RX ORDER — GABAPENTIN 300 MG/1
CAPSULE ORAL
Qty: 180 CAPSULE | Refills: 0 | Status: SHIPPED | OUTPATIENT
Start: 2021-07-19 | End: 2021-09-30

## 2021-08-04 RX ORDER — SILDENAFIL 25 MG/1
TABLET, FILM COATED ORAL
Qty: 30 TABLET | Refills: 1 | Status: SHIPPED | OUTPATIENT
Start: 2021-08-04 | End: 2021-09-16

## 2021-09-16 RX ORDER — SILDENAFIL 25 MG/1
TABLET, FILM COATED ORAL
Qty: 30 TABLET | Refills: 1 | Status: SHIPPED | OUTPATIENT
Start: 2021-09-16 | End: 2021-09-20

## 2021-09-20 RX ORDER — SILDENAFIL 25 MG/1
TABLET, FILM COATED ORAL
Qty: 30 TABLET | Refills: 1 | Status: SHIPPED | OUTPATIENT
Start: 2021-09-20 | End: 2021-10-25 | Stop reason: SDUPTHER

## 2021-09-30 DIAGNOSIS — M54.50 ACUTE BILATERAL LOW BACK PAIN WITHOUT SCIATICA: ICD-10-CM

## 2021-09-30 RX ORDER — GABAPENTIN 300 MG/1
CAPSULE ORAL
Qty: 180 CAPSULE | Refills: 0 | Status: SHIPPED | OUTPATIENT
Start: 2021-09-30 | End: 2021-12-14

## 2021-10-15 DIAGNOSIS — M54.50 ACUTE BILATERAL LOW BACK PAIN WITHOUT SCIATICA: ICD-10-CM

## 2021-10-15 RX ORDER — GABAPENTIN 300 MG/1
600 CAPSULE ORAL NIGHTLY
Qty: 180 CAPSULE | Refills: 0 | OUTPATIENT
Start: 2021-10-15

## 2021-10-15 NOTE — TELEPHONE ENCOUNTER
Caller: Scout Linda    Relationship: Self    Medication requested (name and dosage): gabapentin (NEURONTIN) 300 MG capsule    [place] [facility] [place] [id number] [facility] [address]  [address] [facility] - [phone]  - [phone]   [phone]    Additional details provided by patient: PATIENT HAS ONE WEEK LEFT    Best call back number: [phone]    Does the patient have less than a 3 day supply:  [] Yes  [x] No    Yvette Mcclendon Rep   10/15/21 11:48 EDT

## 2021-10-15 NOTE — TELEPHONE ENCOUNTER
LAST OV 5/26/21  NEXT OV 12/2/21  LAST REFILL 9/30/21----  Per pharmacy tech script was received will get that ready today or tomorrow.  Patient informed.

## 2021-10-25 ENCOUNTER — TELEPHONE (OUTPATIENT)
Dept: INTERNAL MEDICINE | Facility: CLINIC | Age: 76
End: 2021-10-25

## 2021-10-25 DIAGNOSIS — N52.8 OTHER MALE ERECTILE DYSFUNCTION: Primary | ICD-10-CM

## 2021-10-25 RX ORDER — SILDENAFIL 25 MG/1
25 TABLET, FILM COATED ORAL AS NEEDED
Qty: 45 TABLET | Refills: 6 | Status: SHIPPED | OUTPATIENT
Start: 2021-10-25 | End: 2021-10-29

## 2021-10-25 NOTE — TELEPHONE ENCOUNTER
This is fine.  #45.  6 refills.      ----- Message from Sheryl Fitzgerald MA sent at 10/25/2021 11:25 AM EDT -----  Regarding: FW: Sildenafil Prescription   Please advise.  ----- Message -----  From: Scout Linda  Sent: 10/25/2021  11:13 AM EDT  To: Tommy Callaway Clinical Pool  Subject: Sildenafil Prescription                          Could Dr. Callaway please increase the number of Sildenafil pills from 30 per month to 45? Thanks. Colin Linda

## 2021-10-29 DIAGNOSIS — N52.8 OTHER MALE ERECTILE DYSFUNCTION: ICD-10-CM

## 2021-10-29 RX ORDER — SILDENAFIL 25 MG/1
TABLET, FILM COATED ORAL
Qty: 30 TABLET | Refills: 1 | Status: SHIPPED | OUTPATIENT
Start: 2021-10-29 | End: 2021-11-23 | Stop reason: SDUPTHER

## 2021-11-23 ENCOUNTER — TELEPHONE (OUTPATIENT)
Dept: INTERNAL MEDICINE | Facility: CLINIC | Age: 76
End: 2021-11-23

## 2021-11-23 DIAGNOSIS — N52.8 OTHER MALE ERECTILE DYSFUNCTION: ICD-10-CM

## 2021-11-23 RX ORDER — SILDENAFIL 25 MG/1
25-100 TABLET, FILM COATED ORAL DAILY PRN
Qty: 45 TABLET | Refills: 1 | Status: SHIPPED | OUTPATIENT
Start: 2021-11-23 | End: 2021-12-30

## 2021-11-23 NOTE — TELEPHONE ENCOUNTER
Caller: Scout Linda    Relationship to patient: Self    Best call back number: [phone]    Patient is needing: PATIENT CALLED IN AND SAID HE HAS A QUESTION ABOUT sildenafil (VIAGRA) 25 MG tablet. HE WANTED TO MAKE SURE THAT THE MEDICATION WAS CALLED IN FOR A QUANTITY OF 45 INSTEAD OF 30 DUE TO PAST ISSUES WITH THE PHARMACY. PLEASE CALL PATIENT AND ADVISE.

## 2021-12-02 ENCOUNTER — OFFICE VISIT (OUTPATIENT)
Dept: INTERNAL MEDICINE | Facility: CLINIC | Age: 76
End: 2021-12-02

## 2021-12-02 VITALS
DIASTOLIC BLOOD PRESSURE: 78 MMHG | WEIGHT: 198 LBS | RESPIRATION RATE: 16 BRPM | BODY MASS INDEX: 28.35 KG/M2 | OXYGEN SATURATION: 98 % | SYSTOLIC BLOOD PRESSURE: 148 MMHG | HEIGHT: 70 IN | TEMPERATURE: 97.8 F | HEART RATE: 92 BPM

## 2021-12-02 DIAGNOSIS — R06.09 DYSPNEA ON EXERTION: ICD-10-CM

## 2021-12-02 DIAGNOSIS — E78.5 HYPERLIPIDEMIA, UNSPECIFIED HYPERLIPIDEMIA TYPE: Primary | ICD-10-CM

## 2021-12-02 DIAGNOSIS — D59.8 OTHER ACQUIRED HEMOLYTIC ANEMIAS (HCC): ICD-10-CM

## 2021-12-02 DIAGNOSIS — R73.02 IGT (IMPAIRED GLUCOSE TOLERANCE): Chronic | ICD-10-CM

## 2021-12-02 DIAGNOSIS — K21.9 GASTROESOPHAGEAL REFLUX DISEASE, UNSPECIFIED WHETHER ESOPHAGITIS PRESENT: Chronic | ICD-10-CM

## 2021-12-02 PROCEDURE — 1126F AMNT PAIN NOTED NONE PRSNT: CPT | Performed by: INTERNAL MEDICINE

## 2021-12-02 PROCEDURE — 99214 OFFICE O/P EST MOD 30 MIN: CPT | Performed by: INTERNAL MEDICINE

## 2021-12-02 PROCEDURE — 93000 ELECTROCARDIOGRAM COMPLETE: CPT | Performed by: INTERNAL MEDICINE

## 2021-12-02 PROCEDURE — 1170F FXNL STATUS ASSESSED: CPT | Performed by: INTERNAL MEDICINE

## 2021-12-02 PROCEDURE — 1160F RVW MEDS BY RX/DR IN RCRD: CPT | Performed by: INTERNAL MEDICINE

## 2021-12-02 PROCEDURE — G0439 PPPS, SUBSEQ VISIT: HCPCS | Performed by: INTERNAL MEDICINE

## 2021-12-02 RX ORDER — METOPROLOL TARTRATE 50 MG/1
50 TABLET, FILM COATED ORAL 2 TIMES DAILY
Qty: 180 TABLET | Refills: 1 | Status: SHIPPED | OUTPATIENT
Start: 2021-12-02

## 2021-12-02 NOTE — PROGRESS NOTES
The ABCs of the Annual Wellness Visit  Subsequent Medicare Wellness Visit    Chief Complaint   Patient presents with   • Medicare Wellness-subsequent   • Hypertension   • Hyperlipidemia   • Shortness of Breath      Subjective    History of Present Illness:  Scout Linda is a 76 y.o. male who presents for a Subsequent Medicare Wellness Visit.    The following portions of the patient's history were reviewed and   updated as appropriate: allergies, current medications, past family history, past medical history, past social history, past surgical history and problem list.    Compared to one year ago, the patient feels his physical   health is the same.    Compared to one year ago, the patient feels his mental   health is the same.    Recent Hospitalizations:  He was not admitted to the hospital during the last year.       Current Medical Providers:  Patient Care Team:  Scout Callaway MD as PCP - General (Internal Medicine)  Scout Callaway MD as PCP - Internal Medicine (Internal Medicine)    Outpatient Medications Prior to Visit   Medication Sig Dispense Refill   • doxazosin (CARDURA) 4 MG tablet Take 4 mg by mouth every night.     • gabapentin (NEURONTIN) 300 MG capsule TAKE TWO CAPSULES BY MOUTH EVERY NIGHT AT BEDTIME 180 capsule 0   • metoprolol tartrate (LOPRESSOR) 50 MG tablet Take 25 mg by mouth 2 (two) times a day.     • omeprazole (PriLOSEC) 20 MG capsule Take 20 mg by mouth daily.     • sildenafil (VIAGRA) 25 MG tablet Take 1-4 tablets by mouth Daily As Needed for Erectile Dysfunction. 45 tablet 1     No facility-administered medications prior to visit.       No opioid medication identified on active medication list. I have reviewed chart for other potential  high risk medication/s and harmful drug interactions in the elderly.          Aspirin is not on active medication list.  Aspirin use is not indicated based on review of current medical condition/s. Risk of harm outweighs potential benefits.   ".    Patient Active Problem List   Diagnosis   • Low back pain   • PAT (paroxysmal atrial tachycardia) (HCC)   • GERD (gastroesophageal reflux disease)   • Polyp of colon, adenomatous   • IGT (impaired glucose tolerance)   • BPH (benign prostatic hypertrophy)   • Hyperlipidemia   • Spindle cell liposarcoma (HCC)   • Thrombocytopenia (HCC)   • Other acquired hemolytic anemias (HCC)   • CKD (chronic kidney disease) stage 3, GFR 30-59 ml/min (HCC)   • Other male erectile dysfunction     Advance Care Planning  Advance Directive is not on file.  ACP discussion was held with the patient during this visit. Patient has an advance directive (not in EMR), copy requested.          Objective    Vitals:    21 1301   BP: 148/78   Pulse: 92   Resp: 16   Temp: 97.8 °F (36.6 °C)   TempSrc: Infrared   SpO2: 98%   Weight: 89.8 kg (198 lb)   Height: 177.8 cm (70\")   PainSc: 0-No pain     BMI Readings from Last 1 Encounters:   21 28.41 kg/m²   BMI is above normal parameters. Recommendations include: exercise counseling and nutrition counseling    Does the patient have evidence of cognitive impairment? No    Physical Exam            HEALTH RISK ASSESSMENT    Smoking Status:  Social History     Tobacco Use   Smoking Status Former Smoker   • Packs/day: 0.50   • Years: 11.00   • Pack years: 5.50   • Types: Cigarettes   • Start date:    • Quit date: 1987   • Years since quittin.0   Smokeless Tobacco Never Used     Alcohol Consumption:  Social History     Substance and Sexual Activity   Alcohol Use Yes   • Alcohol/week: 3.0 standard drinks   • Types: 3 Cans of beer per week    Comment: 3 beers per week     Fall Risk Screen:    EDYTA Fall Risk Assessment was completed, and patient is at LOW risk for falls.Assessment completed on:2021    Depression Screening:  PHQ-2/PHQ-9 Depression Screening 2021   Little interest or pleasure in doing things 0   Feeling down, depressed, or hopeless 0   Trouble falling or " staying asleep, or sleeping too much 0   Feeling tired or having little energy 1   Poor appetite or overeating 0   Feeling bad about yourself - or that you are a failure or have let yourself or your family down 1   Trouble concentrating on things, such as reading the newspaper or watching television 0   Moving or speaking so slowly that other people could have noticed. Or the opposite - being so fidgety or restless that you have been moving around a lot more than usual 0   Thoughts that you would be better off dead, or of hurting yourself in some way 0   Total Score 2   If you checked off any problems, how difficult have these problems made it for you to do your work, take care of things at home, or get along with other people? Not difficult at all       Health Habits and Functional and Cognitive Screening:  Functional & Cognitive Status 12/2/2021   Do you have difficulty preparing food and eating? No   Do you have difficulty bathing yourself, getting dressed or grooming yourself? No   Do you have difficulty using the toilet? No   Do you have difficulty moving around from place to place? No   Do you have trouble with steps or getting out of a bed or a chair? No   Current Diet Well Balanced Diet   Dental Exam Up to date   Eye Exam Up to date   Exercise (times per week) 0 times per week   Current Exercises Include No Regular Exercise   Current Exercise Activities Include -   Do you need help using the phone?  No   Are you deaf or do you have serious difficulty hearing?  No   Do you need help with transportation? No   Do you need help shopping? No   Do you need help preparing meals?  No   Do you need help with housework?  No   Do you need help with laundry? No   Do you need help taking your medications? No   Do you need help managing money? No   Do you ever drive or ride in a car without wearing a seat belt? No   Have you felt unusual stress, anger or loneliness in the last month? No   Who do you live with? Spouse   If  you need help, do you have trouble finding someone available to you? No   Have you been bothered in the last four weeks by sexual problems? No   Do you have difficulty concentrating, remembering or making decisions? No       Age-appropriate Screening Schedule:  Refer to the list below for future screening recommendations based on patient's age, sex and/or medical conditions. Orders for these recommended tests are listed in the plan section. The patient has been provided with a written plan.    Health Maintenance   Topic Date Due   • INFLUENZA VACCINE  08/01/2021   • LIPID PANEL  05/24/2022   • TDAP/TD VACCINES (2 - Td or Tdap) 01/26/2027   • ZOSTER VACCINE  Completed              Assessment/Plan   CMS Preventative Services Quick Reference  Risk Factors Identified During Encounter  Hearing Problem  Immunizations Discussed/Encouraged (specific Immunizations; Influenza  Inactivity/Sedentary  The above risks/problems have been discussed with the patient.  Follow up actions/plans if indicated are seen below in the Assessment/Plan Section.  Pertinent information has been shared with the patient in the After Visit Summary.    There are no diagnoses linked to this encounter.    Follow Up:   No follow-ups on file.     An After Visit Summary and PPPS were made available to the patient.        I spent 15 minutes caring for Scout on this date of service. This time includes time spent by me in the following activities:preparing for the visit, reviewing tests and obtaining and/or reviewing a separately obtained history

## 2021-12-02 NOTE — PROGRESS NOTES
Subjective   Scout Linda is a 76 y.o. male.     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  Also chronic hemolytic anemia.  History of PSVT.  Has CKD.  History of PSVT.  History of hemolytic anemia with thrombocytopenia.    Hypertension  This is a chronic problem. Associated symptoms include shortness of breath. Pertinent negatives include no chest pain, headaches or malaise/fatigue.   Hyperlipidemia  This is a chronic problem. The current episode started more than 1 year ago. The problem is controlled. Recent lipid tests were reviewed and are normal. There are no known factors aggravating his hyperlipidemia. Associated symptoms include shortness of breath. Pertinent negatives include no chest pain.   Shortness of Breath  Pertinent negatives include no abdominal pain, chest pain, headaches, leg swelling, vomiting or wheezing.   Heartburn  He reports no abdominal pain, no chest pain, no coughing, no nausea or no wheezing. This is a chronic problem. The current episode started more than 1 year ago. The problem has been unchanged. He has tried a PPI for the symptoms.   Anemia  Presents for follow-up visit. There has been no abdominal pain or malaise/fatigue.   Hyperglycemia  This is a chronic problem. The current episode started more than 1 year ago. Pertinent negatives include no abdominal pain, chest pain, coughing, headaches, nausea or vomiting.        The following portions of the patient's history were reviewed and updated as appropriate: allergies, current medications, past social history and problem list.    Outpatient Medications Marked as Taking for the 12/2/21 encounter (Office Visit) with Scout Callaway MD   Medication Sig Dispense Refill   • doxazosin (CARDURA) 4 MG tablet Take 4 mg by mouth every night.     • gabapentin  (NEURONTIN) 300 MG capsule TAKE TWO CAPSULES BY MOUTH EVERY NIGHT AT BEDTIME 180 capsule 0   • metoprolol tartrate (LOPRESSOR) 50 MG tablet Take 25 mg by mouth 2 (two) times a day.     • omeprazole (PriLOSEC) 20 MG capsule Take 20 mg by mouth daily.     • sildenafil (VIAGRA) 25 MG tablet Take 1-4 tablets by mouth Daily As Needed for Erectile Dysfunction. 45 tablet 1       Review of Systems   Constitutional: Negative for malaise/fatigue.   Respiratory: Positive for shortness of breath. Negative for cough and wheezing.    Cardiovascular: Negative for chest pain and leg swelling.   Gastrointestinal: Negative for abdominal pain, constipation, nausea and vomiting.   Neurological: Negative for headaches.       Objective   Vitals:    12/02/21 1301   BP: 148/78   Pulse: 92   Resp: 16   Temp: 97.8 °F (36.6 °C)   SpO2: 98%          12/02/21  1301   Weight: 89.8 kg (198 lb)    [unfilled]  Body mass index is 28.41 kg/m².      Physical Exam   Constitutional: He appears well-developed.   Neck: No thyromegaly present.   Cardiovascular: Normal rate, regular rhythm and normal heart sounds. Exam reveals no gallop.   No murmur heard.  Pulmonary/Chest: Effort normal and breath sounds normal. No respiratory distress. He has no wheezes. He has no rales.   Abdominal: Soft. Normal appearance and bowel sounds are normal. He exhibits no mass. There is no abdominal tenderness. There is no guarding.   Neurological: He is alert.       ECG 12 Lead    Date/Time: 12/2/2021 3:03 PM  Performed by: Scout Callaway MD  Authorized by: Scout Callaway MD   Comparison: compared with previous ECG from 5/29/2020  Similar to previous ECG  Rhythm: sinus rhythm  Rate: normal  Conduction: conduction normal  ST Segments: ST segments normal  T Waves: T waves normal  QRS axis: normal  Other: no other findings    Clinical impression: normal ECG  Comments: Normal sinus rhythm.  Heart rate is 76.  This is a normal EKG.  There is no change from the prior EKG  dated 5/29/2020.                Problems Addressed this Visit        Cardiac and Vasculature    Hyperlipidemia - Primary       Endocrine and Metabolic    IGT (impaired glucose tolerance) (Chronic)       Gastrointestinal Abdominal     GERD (gastroesophageal reflux disease) (Chronic)      Diagnoses       Codes Comments    Hyperlipidemia, unspecified hyperlipidemia type    -  Primary ICD-10-CM: E78.5  ICD-9-CM: 272.4     IGT (impaired glucose tolerance)     ICD-10-CM: R73.02  ICD-9-CM: 790.22     Gastroesophageal reflux disease, unspecified whether esophagitis present     ICD-10-CM: K21.9  ICD-9-CM: 530.81         Assessment/Plan    In for recheck of lipids, GERD, Igt, and OA.  GERD is well controlled on Prilosec daily.  Tolerating lipid treatment well.  Blood pressure control is excellent.  Lipid control is excellent.  Impaired glucose tolerance is about the same.  Annual labs at the [facility] once yearly.  Otherwise stable and looking good.  Will continue monitor lipids, glu, a1c, and cbc q 6 mos. 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  That was on his lower neck or upper back.  He has a chronic hemolytic anemia that has not been a problem.  Stable.  Discussed the need to avoid dehydration and NSAIDs regarding his CKD.  History of PSVT stable.  Annual wellness visit due December 2021.  He has had some insomnia since wife passed away.  He is using gabapentin with excellent results.  Continue to monitor every 6 months.  He mentions some persistent dyspnea which was with sexual activity only.  We will schedule a stress echo for further evaluation.  Check EKG today.  Annual wellness visit today December 2021.  He is on Cardura and Lopressor for hypertension those are reviewed today.  Blood pressure remains a bit high and we will boost metoprolol dose up to 50 mg twice daily from 25 mg twice daily.    The above information was reviewed again today 12/02/21.  It continues to be accurate as reflected above  and is unchanged.  History, physical and review of systems all reviewed and are unchanged.  Medications were reviewed today and continue the current dosing.    PPE today includes face mask and eye shield.         Dragon disclaimer:   Much of this encounter note is an electronic transcription/translation of spoken language to printed text. The electronic translation of spoken language may permit erroneous, or at times, nonsensical words or phrases to be inadvertently transcribed; Although I have reviewed the note for such errors, some may still exist.

## 2021-12-02 NOTE — PATIENT INSTRUCTIONS
Influenza (Flu) Vaccine (Inactivated or Recombinant): What You Need to Know  1. Why get vaccinated?  Influenza vaccine can prevent influenza (flu).  Flu is a contagious disease that spreads around the [place] every year, usually between October and May. Anyone can get the flu, but it is more dangerous for some people. Infants and young children, people 65 years of age and older, pregnant women, and people with certain health conditions or a weakened immune system are at greatest risk of flu complications.  Pneumonia, bronchitis, sinus infections and ear infections are examples of flu-related complications. If you have a medical condition, such as heart disease, cancer or diabetes, flu can make it worse.  Flu can cause fever and chills, sore throat, muscle aches, fatigue, cough, headache, and runny or stuffy nose. Some people may have vomiting and diarrhea, though this is more common in children than adults.  Each year thousands of people in the [place] die from flu, and many more are hospitalized. Flu vaccine prevents millions of illnesses and flu-related visits to the doctor each year.  2. Influenza vaccine  CDC recommends everyone 6 months of age and older get vaccinated every flu season. Children 6 months through 8 years of age may need 2 doses during a single flu season. Everyone else needs only 1 dose each flu season.  It takes about 2 weeks for protection to develop after vaccination.  There are many flu viruses, and they are always changing. Each year a new flu vaccine is made to protect against three or four viruses that are likely to cause disease in the upcoming flu season. Even when the vaccine doesn't exactly match these viruses, it may still provide some protection.  Influenza vaccine does not cause flu.  Influenza vaccine may be given at the same time as other vaccines.  3. Talk with your health care provider  Tell your vaccine provider if the person getting the vaccine:  · Has had an  allergic reaction after a previous dose of influenza vaccine, or has any severe, life-threatening allergies.  · Has ever had Guillain-Barré Syndrome (also called GBS).  In some cases, your health care provider may decide to postpone influenza vaccination to a future visit.  People with minor illnesses, such as a cold, may be vaccinated. People who are moderately or severely ill should usually wait until they recover before getting influenza vaccine.  Your health care provider can give you more information.  4. Risks of a vaccine reaction  · Soreness, redness, and swelling where shot is given, fever, muscle aches, and headache can happen after influenza vaccine.  · There may be a very small increased risk of Guillain-Barré Syndrome (GBS) after inactivated influenza vaccine (the flu shot).  Young children who get the flu shot along with pneumococcal vaccine (PCV13), and/or DTaP vaccine at the same time might be slightly more likely to have a seizure caused by fever. Tell your health care provider if a child who is getting flu vaccine has ever had a seizure.  People sometimes faint after medical procedures, including vaccination. Tell your provider if you feel dizzy or have vision changes or ringing in the ears.  As with any medicine, there is a very remote chance of a vaccine causing a severe allergic reaction, other serious injury, or death.  5. What if there is a serious problem?  An allergic reaction could occur after the vaccinated person leaves the clinic. If you see signs of a severe allergic reaction (hives, swelling of the face and throat, difficulty breathing, a fast heartbeat, dizziness, or weakness), call 9-1-1 and get the person to the nearest hospital.  For other signs that concern you, call your health care provider.  Adverse reactions should be reported to the Vaccine Adverse Event Reporting System (VAERS). Your health care provider will usually file this report, or you can do it yourself. Visit the  VAERS website at www.vaers.hhs.gov or call 1-824.577.1871. VAERS is only for reporting reactions, and VAERS staff do not give medical advice.  6. The National Vaccine Injury Compensation Program  The National Vaccine Injury Compensation Program (VICP) is a federal program that was created to compensate people who may have been injured by certain vaccines. Visit the VICP website at www.hrsa.gov/vaccinecompensation or call 1-655.927.5653 to learn about the program and about filing a claim. There is a time limit to file a claim for compensation.  7. How can I learn more?  · Ask your healthcare provider.  · Call your local or state health department.  · Contact the Centers for Disease Control and Prevention (CDC):  ? Call 1-747.544.1133 (7-102-MMX-INFO) or  ? Visit CDC's www.cdc.gov/flu  Vaccine Information Statement (Interim) Inactivated Influenza Vaccine (8/15/2019)  This information is not intended to replace advice given to you by your health care provider. Make sure you discuss any questions you have with your health care provider.  Document Revised: 12/09/2020 Document Reviewed: 12/09/2020  [employer] Patient Education © 2021 [employer]  Fall Prevention in the Home, Adult  Falls can cause injuries and can affect people from all age groups. There are many simple things that you can do to make your home safe and to help prevent falls. Ask for help when making these changes, if needed.  What actions can I take to prevent falls?  General instructions  · Use good lighting in all rooms. Replace any light bulbs that burn out.  · Turn on lights if it is dark. Use night-lights.  · Place frequently used items in easy-to-reach places. Lower the shelves around your home if necessary.  · Set up furniture so that there are clear paths around it. Avoid moving your furniture around.  · Remove throw rugs and other tripping hazards from the floor.  · Avoid walking on wet floors.  · Fix any uneven floor surfaces.  · Add color or  contrast paint or tape to grab bars and handrails in your home. Place contrasting color strips on the first and last steps of stairways.  · When you use a stepladder, make sure that it is completely opened and that the sides are firmly locked. Have someone hold the ladder while you are using it. Do not climb a closed stepladder.  · Be aware of any and all pets.  What can I do in the bathroom?         · Keep the floor dry. Immediately clean up any water that spills onto the floor.  · Remove soap buildup in the tub or shower on a regular basis.  · Use non-skid mats or decals on the floor of the tub or shower.  · Attach bath mats securely with double-sided, non-slip rug tape.  · If you need to sit down while you are in the shower, use a plastic, non-slip stool.  · Install grab bars by the toilet and in the tub and shower. Do not use towel bars as grab bars.  What can I do in the bedroom?  · Make sure that a bedside light is easy to reach.  · Do not use oversized bedding that drapes onto the floor.  · Have a firm chair that has side arms to use for getting dressed.  What can I do in the kitchen?  · Clean up any spills right away.  · If you need to reach for something above you, use a sturdy step stool that has a grab bar.  · Keep electrical cables out of the way.  · Do not use floor polish or wax that makes floors slippery. If you must use wax, make sure that it is non-skid floor wax.  What can I do in the stairways?  · Do not leave any items on the stairs.  · Make sure that you have a light switch at the top of the stairs and the bottom of the stairs. Have them installed if you do not have them.  · Make sure that there are handrails on both sides of the stairs. Fix handrails that are broken or loose. Make sure that handrails are as long as the stairways.  · Install non-slip stair treads on all stairs in your home.  · Avoid having throw rugs at the top or bottom of stairways, or secure the rugs with carpet tape to  prevent them from moving.  · Choose a carpet design that does not hide the edge of steps on the stairway.  · Check any carpeting to make sure that it is firmly attached to the stairs. Fix any carpet that is loose or worn.  What can I do on the outside of my home?  · Use bright outdoor lighting.  · Regularly repair the edges of walkways and driveways and fix any cracks.  · Remove high doorway thresholds.  · Trim any shrubbery on the main path into your home.  · Regularly check that handrails are securely fastened and in good repair. Both sides of any steps should have handrails.  · Install guardrails along the edges of any raised decks or porches.  · Clear walkways of debris and clutter, including tools and rocks.  · Have leaves, snow, and ice cleared regularly.  · Use sand or salt on walkways during winter months.  · In the garage, clean up any spills right away, including grease or oil spills.  What other actions can I take?  · Wear closed-toe shoes that fit well and support your feet. Wear shoes that have rubber soles or low heels.  · Use mobility aids as needed, such as canes, walkers, scooters, and crutches.  · Review your medicines with your health care provider. Some medicines can cause dizziness or changes in blood pressure, which increase your risk of falling.  Talk with your health care provider about other ways that you can decrease your risk of falls. This may include working with a physical therapist or  to improve your strength, balance, and endurance.  Where to find more information  · Centers for Disease Control and Prevention, [place]: https://www.cdc.gov  · National [place] on Aging: https://dq5zynf.jamie.nih.gov  Contact a health care provider if:  · You are afraid of falling at home.  · You feel weak, drowsy, or dizzy at home.  · You fall at home.  Summary  · There are many simple things that you can do to make your home safe and to help prevent falls.  · Ways to make your home safe include  removing tripping hazards and installing grab bars in the bathroom.  · Ask for help when making these changes in your home.  This information is not intended to replace advice given to you by your health care provider. Make sure you discuss any questions you have with your health care provider.  Document Revised: 11/30/2018 Document Reviewed: 08/02/2018  [employer] Patient Education © 2021 [employer]  Exercising to Stay Healthy  To become healthy and stay healthy, it is recommended that you do moderate-intensity and vigorous-intensity exercise. You can tell that you are exercising at a moderate intensity if your heart starts beating faster and you start breathing faster but can still hold a conversation. You can tell that you are exercising at a vigorous intensity if you are breathing much harder and faster and cannot hold a conversation while exercising.  Exercising regularly is important. It has many health benefits, such as:  · Improving overall fitness, flexibility, and endurance.  · Increasing bone density.  · Helping with weight control.  · Decreasing body fat.  · Increasing muscle strength.  · Reducing stress and tension.  · Improving overall health.  How often should I exercise?  Choose an activity that you enjoy, and set realistic goals. Your health care provider can help you make an activity plan that works for you.  Exercise regularly as told by your health care provider. This may include:  · Doing strength training two times a week, such as:  ? Lifting weights.  ? Using resistance bands.  ? Push-ups.  ? Sit-ups.  ? Yoga.  · Doing a certain intensity of exercise for a given amount of time. Choose from these options:  ? A total of 150 minutes of moderate-intensity exercise every week.  ? A total of 75 minutes of vigorous-intensity exercise every week.  ? A mix of moderate-intensity and vigorous-intensity exercise every week.  Children, pregnant women, people who have not exercised regularly, people who  are overweight, and older adults may need to talk with a health care provider about what activities are safe to do. If you have a medical condition, be sure to talk with your health care provider before you start a new exercise program.  What are some exercise ideas?  Moderate-intensity exercise ideas include:  · Walking 1 mile (1.6 km) in about 15 minutes.  · Biking.  · Hiking.  · Golfing.  · Dancing.  · Water aerobics.  Vigorous-intensity exercise ideas include:  · Walking 4.5 miles (7.2 km) or more in about 1 hour.  · Jogging or running 5 miles (8 km) in about 1 hour.  · Biking 10 miles (16.1 km) or more in about 1 hour.  · Lap swimming.  · Roller-skating or in-line skating.  · Cross-country skiing.  · Vigorous competitive sports, such as football, basketball, and soccer.  · Jumping rope.  · Aerobic dancing.  What are some everyday activities that can help me to get exercise?  · Yard work, such as:  ? Pushing a .  ? Raking and bagging leaves.  · Washing your car.  · Pushing a stroller.  · Shoveling snow.  · Gardening.  · Washing windows or floors.  How can I be more active in my day-to-day activities?  · Use stairs instead of an elevator.  · Take a walk during your lunch break.  · If you drive, park your car farther away from your work or school.  · If you take public transportation, get off one stop early and walk the rest of the way.  · Stand up or walk around during all of your indoor phone calls.  · Get up, stretch, and walk around every 30 minutes throughout the day.  · Enjoy exercise with a friend. Support to continue exercising will help you keep a regular routine of activity.  What guidelines can I follow while exercising?  · Before you start a new exercise program, talk with your health care provider.  · Do not exercise so much that you hurt yourself, feel dizzy, or get very short of breath.  · Wear comfortable clothes and wear shoes with good support.  · Drink plenty of water while you  exercise to prevent dehydration or heat stroke.  · Work out until your breathing and your heartbeat get faster.  Where to find more information  · [place] Department of Health and Human Services: www.hhs.gov  · Centers for Disease Control and Prevention (CDC): www.cdc.gov  Summary  · Exercising regularly is important. It will improve your overall fitness, flexibility, and endurance.  · Regular exercise also will improve your overall health. It can help you control your weight, reduce stress, and improve your bone density.  · Do not exercise so much that you hurt yourself, feel dizzy, or get very short of breath.  · Before you start a new exercise program, talk with your health care provider.  This information is not intended to replace advice given to you by your health care provider. Make sure you discuss any questions you have with your health care provider.  Document Revised: 11/30/2018 Document Reviewed: 11/08/2018  [employer] Patient Education © 2021 [employer]  Calorie Counting for Weight Loss  Calories are units of energy. Your body needs a certain number of calories from food to keep going throughout the day. When you eat or drink more calories than your body needs, your body stores the extra calories mostly as fat. When you eat or drink fewer calories than your body needs, your body burns fat to get the energy it needs.  Calorie counting means keeping track of how many calories you eat and drink each day. Calorie counting can be helpful if you need to lose weight. If you eat fewer calories than your body needs, you should lose weight. Ask your health care provider what a healthy weight is for you.  For calorie counting to work, you will need to eat the right number of calories each day to lose a healthy amount of weight per week. A dietitian can help you figure out how many calories you need in a day and will suggest ways to reach your calorie goal.  · A healthy amount of weight to lose each week is usually  1-2 lb (0.5-0.9 kg). This usually means that your daily calorie intake should be reduced by 500-750 calories.  · Eating 1,200-1,500 calories a day can help most women lose weight.  · Eating 1,500-1,800 calories a day can help most men lose weight.  What do I need to know about calorie counting?  Work with your health care provider or dietitian to determine how many calories you should get each day. To meet your daily calorie goal, you will need to:  · Find out how many calories are in each food that you would like to eat. Try to do this before you eat.  · Decide how much of the food you plan to eat.  · Keep a food log. Do this by writing down what you ate and how many calories it had.  To successfully lose weight, it is important to balance calorie counting with a healthy lifestyle that includes regular activity.  Where do I find calorie information?    The number of calories in a food can be found on a Nutrition Facts label. If a food does not have a Nutrition Facts label, try to look up the calories online or ask your dietitian for help.  Remember that calories are listed per serving. If you choose to have more than one serving of a food, you will have to multiply the calories per serving by the number of servings you plan to eat. For example, the label on a package of bread might say that a serving size is 1 slice and that there are 90 calories in a serving. If you eat 1 slice, you will have eaten 90 calories. If you eat 2 slices, you will have eaten 180 calories.  How do I keep a food log?  After each time that you eat, record the following in your food log as soon as possible:  · What you ate. Be sure to include toppings, sauces, and other extras on the food.  · How much you ate. This can be measured in cups, ounces, or number of items.  · How many calories were in each food and drink.  · The total number of calories in the food you ate.  Keep your food log near you, such as in a pocket-sized notebook or on an  nikita or website on your mobile phone. Some programs will calculate calories for you and show you how many calories you have left to meet your daily goal.  What are some portion-control tips?  · Know how many calories are in a serving. This will help you know how many servings you can have of a certain food.  · Use a measuring cup to measure serving sizes. You could also try weighing out portions on a kitchen scale. With time, you will be able to estimate serving sizes for some foods.  · Take time to put servings of different foods on your favorite plates or in your favorite bowls and cups so you know what a serving looks like.  · Try not to eat straight from a food's packaging, such as from a bag or box. Eating straight from the package makes it hard to see how much you are eating and can lead to overeating. Put the amount you would like to eat in a cup or on a plate to make sure you are eating the right portion.  · Use smaller plates, glasses, and bowls for smaller portions and to prevent overeating.  · Try not to multitask. For example, avoid watching TV or using your computer while eating. If it is time to eat, sit down at a table and enjoy your food. This will help you recognize when you are full. It will also help you be more mindful of what and how much you are eating.  What are tips for following this plan?  Reading food labels  · Check the calorie count compared with the serving size. The serving size may be smaller than what you are used to eating.  · Check the source of the calories. Try to choose foods that are high in protein, fiber, and vitamins, and low in saturated fat, trans fat, and sodium.  Shopping  · Read nutrition labels while you shop. This will help you make healthy decisions about which foods to buy.  · Pay attention to nutrition labels for low-fat or fat-free foods. These foods sometimes have the same number of calories or more calories than the full-fat versions. They also often have added  sugar, starch, or salt to make up for flavor that was removed with the fat.  · Make a grocery list of lower-calorie foods and stick to it.  Cooking  · Try to cook your favorite foods in a healthier way. For example, try baking instead of frying.  · Use low-fat dairy products.  Meal planning  · Use more fruits and vegetables. One-half of your plate should be fruits and vegetables.  · Include lean proteins, such as chicken, turkey, and fish.  Lifestyle  Each week, aim to do one of the following:  · 150 minutes of moderate exercise, such as walking.  · 75 minutes of vigorous exercise, such as running.  General information  · Know how many calories are in the foods you eat most often. This will help you calculate calorie counts faster.  · Find a way of tracking calories that works for you. Get creative. Try different apps or programs if writing down calories does not work for you.  What foods should I eat?    · Eat nutritious foods. It is better to have a nutritious, high-calorie food, such as an avocado, than a food with few nutrients, such as a bag of potato chips.  · Use your calories on foods and drinks that will fill you up and will not leave you hungry soon after eating.  ? Examples of foods that fill you up are nuts and nut butters, vegetables, lean proteins, and high-fiber foods such as whole grains. High-fiber foods are foods with more than 5 g of fiber per serving.  · Pay attention to calories in drinks. Low-calorie drinks include water and unsweetened drinks.  The items listed above may not be a complete list of foods and beverages you can eat. Contact a dietitian for more information.  What foods should I limit?  Limit foods or drinks that are not good sources of vitamins, minerals, or protein or that are high in unhealthy fats. These include:  · Candy.  · Other sweets.  · Sodas, specialty coffee drinks, alcohol, and juice.  The items listed above may not be a complete list of foods and beverages you should  avoid. Contact a dietitian for more information.  How do I count calories when eating out?  · Pay attention to portions. Often, portions are much larger when eating out. Try these tips to keep portions smaller:  ? Consider sharing a meal instead of getting your own.  ? If you get your own meal, eat only half of it. Before you start eating, ask for a container and put half of your meal into it.  ? When available, consider ordering smaller portions from the menu instead of full portions.  · Pay attention to your food and drink choices. Knowing the way food is cooked and what is included with the meal can help you eat fewer calories.  ? If calories are listed on the menu, choose the lower-calorie options.  ? Choose dishes that include vegetables, fruits, whole grains, low-fat dairy products, and lean proteins.  ? Choose items that are boiled, broiled, grilled, or steamed. Avoid items that are buttered, battered, fried, or served with cream sauce. Items labeled as crispy are usually fried, unless stated otherwise.  ? Choose water, low-fat milk, unsweetened iced tea, or other drinks without added sugar. If you want an alcoholic beverage, choose a lower-calorie option, such as a glass of wine or light beer.  ? Ask for dressings, sauces, and syrups on the side. These are usually high in calories, so you should limit the amount you eat.  ? If you want a salad, choose a garden salad and ask for grilled meats. Avoid extra toppings such as pleitez, cheese, or fried items. Ask for the dressing on the side, or ask for olive oil and vinegar or lemon to use as dressing.  · Estimate how many servings of a food you are given. Knowing serving sizes will help you be aware of how much food you are eating at restaurants.  Where to find more information  · Centers for Disease Control and Prevention: www.cdc.gov  · [place] Department of Agriculture: myplate.gov  Summary  · Calorie counting means keeping track of how many calories you eat and  drink each day. If you eat fewer calories than your body needs, you should lose weight.  · A healthy amount of weight to lose per week is usually 1-2 lb (0.5-0.9 kg). This usually means reducing your daily calorie intake by 500-750 calories.  · The number of calories in a food can be found on a Nutrition Facts label. If a food does not have a Nutrition Facts label, try to look up the calories online or ask your dietitian for help.  · Use smaller plates, glasses, and bowls for smaller portions and to prevent overeating.  · Use your calories on foods and drinks that will fill you up and not leave you hungry shortly after a meal.  This information is not intended to replace advice given to you by your health care provider. Make sure you discuss any questions you have with your health care provider.  Document Revised: 01/28/2021 Document Reviewed: 01/28/2021  [employer] Patient Education © 2021 [employer]  BMI for Adults  What is BMI?  Body mass index (BMI) is a number that is calculated from a person's weight and height. BMI can help estimate how much of a person's weight is composed of fat. BMI does not measure body fat directly. Rather, it is an alternative to procedures that directly measure body fat, which can be difficult and expensive.  BMI can help identify people who may be at higher risk for certain medical problems.  What are BMI measurements used for?  BMI is used as a screening tool to identify possible weight problems. It helps determine whether a person is obese, overweight, a healthy weight, or underweight.  BMI is useful for:  · Identifying a weight problem that may be related to a medical condition or may increase the risk for medical problems.  · Promoting changes, such as changes in diet and exercise, to help reach a healthy weight. BMI screening can be repeated to see if these changes are working.  How is BMI calculated?  BMI involves measuring your weight in relation to your height. Both height  "and weight are measured, and the BMI is calculated from those numbers. This can be done either in English (U.S[place] or metric measurements. Note that charts and online BMI calculators are available to help you find your BMI quickly and easily without having to do these calculations yourself.  To calculate your BMI in English (U.S.) measurements:    1. Measure your weight in pounds (lb).  2. Multiply the number of pounds by 703.  ? For example, for a person who weighs 180 lb, multiply that number by 703, which equals 126,540.  3. Measure your height in inches. Then multiply that number by itself to get a measurement called \"inches squared.\"  ? For example, for a person who is 70 inches tall, the \"inches squared\" measurement is 70 inches x 70 inches, which equals 4,900 inches squared.  4. Divide the total from step 2 (number of lb x 703) by the total from step 3 (inches squared): 126,540 ÷ 4,900 = 25.8. This is your BMI.    To calculate your BMI in metric measurements:  1. Measure your weight in kilograms (kg).  2. Measure your height in meters (m). Then multiply that number by itself to get a measurement called \"meters squared.\"  ? For example, for a person who is 1.75 m tall, the \"meters squared\" measurement is 1.75 m x 1.75 m, which is equal to 3.1 meters squared.  3. Divide the number of kilograms (your weight) by the meters squared number. In this example: 70 ÷ 3.1 = 22.6. This is your BMI.  What do the results mean?  BMI charts are used to identify whether you are underweight, normal weight, overweight, or obese. The following guidelines will be used:  · Underweight: BMI less than 18.5.  · Normal weight: BMI between 18.5 and 24.9.  · Overweight: BMI between 25 and 29.9.  · Obese: BMI of 30 or above.  Keep these notes in mind:  · Weight includes both fat and muscle, so someone with a muscular build, such as an athlete, may have a BMI that is higher than 24.9. In cases like these, BMI is not an accurate measure of " body fat.  · To determine if excess body fat is the cause of a BMI of 25 or higher, further assessments may need to be done by a health care provider.  · BMI is usually interpreted in the same way for men and women.  Where to find more information  For more information about BMI, including tools to quickly calculate your BMI, go to these websites:  · Centers for Disease Control and Prevention: www.cdc.gov  · American Heart Association: www.heart.org  · National Heart, Lung, and Blood [place]: www.nhlbi.nih.gov  Summary  · Body mass index (BMI) is a number that is calculated from a person's weight and height.  · BMI may help estimate how much of a person's weight is composed of fat. BMI can help identify those who may be at higher risk for certain medical problems.  · BMI can be measured using English measurements or metric measurements.  · BMI charts are used to identify whether you are underweight, normal weight, overweight, or obese.  This information is not intended to replace advice given to you by your health care provider. Make sure you discuss any questions you have with your health care provider.  Document Revised: 09/09/2020 Document Reviewed: 07/17/2020  [employer] Patient Education © 2021 [employer]    Advance Care Planning and Advance Directives     You make decisions on a daily basis - decisions about where you want to live, your career, your home, your life. Perhaps one of the most important decisions you face is your choice for future medical care. Take time to talk with your family and your healthcare team and start planning today.  Advance Care Planning is a process that can help you:  · Understand possible future healthcare decisions in light of your own experiences  · Reflect on those decision in light of your goals and values  · Discuss your decisions with those closest to you and the healthcare professionals that care for you  · Make a plan by creating a document that reflects your  wishes    Surrogate Decision Maker  In the event of a medical emergency, which has left you unable to communicate or to make your own decisions, you would need someone to make decisions for you.  It is important to discuss your preferences for medical treatment with this person while you are in good health.     Qualities of a surrogate decision maker:  • Willing to take on this role and responsibility  • Knows what you want for future medical care  • Willing to follow your wishes even if they don't agree with them  • Able to make difficult medical decisions under stressful circumstances    Advance Directives  These are legal documents you can create that will guide your healthcare team and decision maker(s) when needed. These documents can be stored in the electronic medical record.    · Living Will - a legal document to guide your care if you have a terminal condition or a serious illness and are unable to communicate. States vary by statute in document names/types, but most forms may include one or more of the following:        -  Directions regarding life-prolonging treatments        -  Directions regarding artificially provided nutrition/hydration        -  Choosing a healthcare decision maker        -  Direction regarding organ/tissue donation    · Durable Power of  for Healthcare - this document names an -in-fact to make medical decisions for you, but it may also allow this person to make personal and financial decisions for you. Please seek the advice of an  if you need this type of document.    **Advance Directives are not required and no one may discriminate against you if you do not sign one.    Medical Orders  Many states allow specific forms/orders signed by your physician to record your wishes for medical treatment in your current state of health. This form, signed in personal communication with your physician, addresses resuscitation and other medical interventions that you may  or may not want.      For more information or to schedule a time with a [facility] Advance Care Planning Facilitator contact: [email or web address] or call [phone] and someone will contact you directly.

## 2021-12-03 DIAGNOSIS — R06.09 DYSPNEA ON EXERTION: Primary | ICD-10-CM

## 2021-12-04 ENCOUNTER — IMMUNIZATION (OUTPATIENT)
Dept: VACCINE CLINIC | Facility: HOSPITAL | Age: 76
End: 2021-12-04

## 2021-12-04 PROCEDURE — 0004A HC ADM SARSCOV2 30MCG/0.3ML BOOSTER: CPT | Performed by: INTERNAL MEDICINE

## 2021-12-04 PROCEDURE — 91300 HC SARSCOV02 VAC 30MCG/0.3ML IM: CPT | Performed by: INTERNAL MEDICINE

## 2021-12-14 DIAGNOSIS — M54.50 ACUTE BILATERAL LOW BACK PAIN WITHOUT SCIATICA: ICD-10-CM

## 2021-12-14 RX ORDER — GABAPENTIN 300 MG/1
CAPSULE ORAL
Qty: 180 CAPSULE | Refills: 0 | Status: SHIPPED | OUTPATIENT
Start: 2021-12-14 | End: 2022-03-08

## 2021-12-29 DIAGNOSIS — N52.8 OTHER MALE ERECTILE DYSFUNCTION: ICD-10-CM

## 2021-12-30 RX ORDER — SILDENAFIL 25 MG/1
TABLET, FILM COATED ORAL
Qty: 45 TABLET | Refills: 1 | Status: SHIPPED | OUTPATIENT
Start: 2021-12-30 | End: 2022-02-07

## 2022-01-18 ENCOUNTER — HOSPITAL ENCOUNTER (OUTPATIENT)
Dept: CARDIOLOGY | Facility: HOSPITAL | Age: 77
Discharge: HOME | End: 2022-01-18
Admitting: INTERNAL MEDICINE

## 2022-01-18 VITALS
WEIGHT: 198 LBS | SYSTOLIC BLOOD PRESSURE: 136 MMHG | HEIGHT: 70 IN | DIASTOLIC BLOOD PRESSURE: 79 MMHG | HEART RATE: 70 BPM | OXYGEN SATURATION: 98 % | BODY MASS INDEX: 28.35 KG/M2

## 2022-01-18 DIAGNOSIS — R06.09 DYSPNEA ON EXERTION: ICD-10-CM

## 2022-01-18 LAB
ASCENDING AORTA: 3 CM
BH CV ECHO MEAS - ACS: 2.3 CM
BH CV ECHO MEAS - AO MAX PG (FULL): 4 MMHG
BH CV ECHO MEAS - AO MAX PG: 8 MMHG
BH CV ECHO MEAS - AO MEAN PG (FULL): 1.8 MMHG
BH CV ECHO MEAS - AO MEAN PG: 4 MMHG
BH CV ECHO MEAS - AO ROOT AREA (BSA CORRECTED): 1.8
BH CV ECHO MEAS - AO ROOT AREA: 10.5 CM^2
BH CV ECHO MEAS - AO ROOT DIAM: 3.6 CM
BH CV ECHO MEAS - AO V2 MAX: 141.8 CM/SEC
BH CV ECHO MEAS - AO V2 MEAN: 92 CM/SEC
BH CV ECHO MEAS - AO V2 VTI: 31.1 CM
BH CV ECHO MEAS - ASC AORTA: 3 CM
BH CV ECHO MEAS - AVA(I,A): 2.7 CM^2
BH CV ECHO MEAS - AVA(I,D): 2.7 CM^2
BH CV ECHO MEAS - AVA(V,A): 2.3 CM^2
BH CV ECHO MEAS - AVA(V,D): 2.3 CM^2
BH CV ECHO MEAS - BSA(HAYCOCK): 2.1 M^2
BH CV ECHO MEAS - BSA: 2.1 M^2
BH CV ECHO MEAS - BZI_BMI: 28.4 KILOGRAMS/M^2
BH CV ECHO MEAS - BZI_METRIC_HEIGHT: 177.8 CM
BH CV ECHO MEAS - BZI_METRIC_WEIGHT: 89.8 KG
BH CV ECHO MEAS - EDV(CUBED): 124.6 ML
BH CV ECHO MEAS - EDV(MOD-SP2): 135 ML
BH CV ECHO MEAS - EDV(MOD-SP4): 134 ML
BH CV ECHO MEAS - EDV(TEICH): 118 ML
BH CV ECHO MEAS - EF(CUBED): 62.4 %
BH CV ECHO MEAS - EF(MOD-BP): 69 %
BH CV ECHO MEAS - EF(MOD-SP2): 80 %
BH CV ECHO MEAS - EF(MOD-SP4): 60.4 %
BH CV ECHO MEAS - EF(TEICH): 53.7 %
BH CV ECHO MEAS - ESV(MOD-SP2): 27 ML
BH CV ECHO MEAS - ESV(MOD-SP4): 53 ML
BH CV ECHO MEAS - ESV(TEICH): 54.6 ML
BH CV ECHO MEAS - IVS/LVPW: 0.99
BH CV ECHO MEAS - IVSD: 0.96 CM
BH CV ECHO MEAS - LAT PEAK E' VEL: 7.7 CM/SEC
BH CV ECHO MEAS - LV DIASTOLIC VOL/BSA (35-75): 64.5 ML/M^2
BH CV ECHO MEAS - LV MASS(C)D: 174.3 GRAMS
BH CV ECHO MEAS - LV MASS(C)DI: 83.8 GRAMS/M^2
BH CV ECHO MEAS - LV MAX PG: 4.1 MMHG
BH CV ECHO MEAS - LV MEAN PG: 2.3 MMHG
BH CV ECHO MEAS - LV SYSTOLIC VOL/BSA (12-30): 25.5 ML/M^2
BH CV ECHO MEAS - LV V1 MAX: 100.7 CM/SEC
BH CV ECHO MEAS - LV V1 MEAN: 70.6 CM/SEC
BH CV ECHO MEAS - LV V1 VTI: 25.9 CM
BH CV ECHO MEAS - LVIDD: 5 CM
BH CV ECHO MEAS - LVIDS: 3.6 CM
BH CV ECHO MEAS - LVLD AP2: 8.1 CM
BH CV ECHO MEAS - LVLD AP4: 8 CM
BH CV ECHO MEAS - LVLS AP2: 5.7 CM
BH CV ECHO MEAS - LVLS AP4: 6.8 CM
BH CV ECHO MEAS - LVOT AREA (M): 3.1 CM^2
BH CV ECHO MEAS - LVOT AREA: 3.3 CM^2
BH CV ECHO MEAS - LVOT DIAM: 2 CM
BH CV ECHO MEAS - LVPWD: 0.97 CM
BH CV ECHO MEAS - MED PEAK E' VEL: 6.4 CM/SEC
BH CV ECHO MEAS - MR MAX PG: 85.7 MMHG
BH CV ECHO MEAS - MR MAX VEL: 462.9 CM/SEC
BH CV ECHO MEAS - MV A DUR: 0.13 SEC
BH CV ECHO MEAS - MV A MAX VEL: 75.8 CM/SEC
BH CV ECHO MEAS - MV DEC SLOPE: 599.3 CM/SEC^2
BH CV ECHO MEAS - MV DEC TIME: 0.15 SEC
BH CV ECHO MEAS - MV E MAX VEL: 86.5 CM/SEC
BH CV ECHO MEAS - MV E/A: 1.1
BH CV ECHO MEAS - MV P1/2T MAX VEL: 85.3 CM/SEC
BH CV ECHO MEAS - MV P1/2T: 41.7 MSEC
BH CV ECHO MEAS - MVA P1/2T LCG: 2.6 CM^2
BH CV ECHO MEAS - MVA(P1/2T): 5.3 CM^2
BH CV ECHO MEAS - PA ACC TIME: 0.12 SEC
BH CV ECHO MEAS - PA MAX PG (FULL): 2.4 MMHG
BH CV ECHO MEAS - PA MAX PG: 3.7 MMHG
BH CV ECHO MEAS - PA PR(ACCEL): 26.7 MMHG
BH CV ECHO MEAS - PA V2 MAX: 96.4 CM/SEC
BH CV ECHO MEAS - PULM A REVS DUR: 0.12 SEC
BH CV ECHO MEAS - PULM A REVS VEL: 20.8 CM/SEC
BH CV ECHO MEAS - PULM DIAS VEL: 19.5 CM/SEC
BH CV ECHO MEAS - PULM S/D: 1.5
BH CV ECHO MEAS - PULM SYS VEL: 29.7 CM/SEC
BH CV ECHO MEAS - PVA(V,A): 1.6 CM^2
BH CV ECHO MEAS - PVA(V,D): 1.6 CM^2
BH CV ECHO MEAS - QP/QS: 0.42
BH CV ECHO MEAS - RAP SYSTOLE: 3 MMHG
BH CV ECHO MEAS - RV MAX PG: 1.3 MMHG
BH CV ECHO MEAS - RV MEAN PG: 0.88 MMHG
BH CV ECHO MEAS - RV V1 MAX: 57.4 CM/SEC
BH CV ECHO MEAS - RV V1 MEAN: 44.2 CM/SEC
BH CV ECHO MEAS - RV V1 VTI: 13.1 CM
BH CV ECHO MEAS - RVOT AREA: 2.7 CM^2
BH CV ECHO MEAS - RVOT DIAM: 1.9 CM
BH CV ECHO MEAS - RVSP: 32 MMHG
BH CV ECHO MEAS - SI(AO): 156.4 ML/M^2
BH CV ECHO MEAS - SI(CUBED): 37.4 ML/M^2
BH CV ECHO MEAS - SI(LVOT): 41 ML/M^2
BH CV ECHO MEAS - SI(MOD-SP2): 52 ML/M^2
BH CV ECHO MEAS - SI(MOD-SP4): 39 ML/M^2
BH CV ECHO MEAS - SI(TEICH): 30.5 ML/M^2
BH CV ECHO MEAS - SV(AO): 325.1 ML
BH CV ECHO MEAS - SV(CUBED): 77.8 ML
BH CV ECHO MEAS - SV(LVOT): 85.2 ML
BH CV ECHO MEAS - SV(MOD-SP2): 108 ML
BH CV ECHO MEAS - SV(MOD-SP4): 81 ML
BH CV ECHO MEAS - SV(RVOT): 35.5 ML
BH CV ECHO MEAS - SV(TEICH): 63.3 ML
BH CV ECHO MEAS - TR MAX VEL: 267.4 CM/SEC
BH CV ECHO MEASUREMENTS AVERAGE E/E' RATIO: 12.27
BH CV STRESS BP STAGE 1: NORMAL
BH CV STRESS BP STAGE 2: NORMAL
BH CV STRESS BP STAGE 3: NORMAL
BH CV STRESS DURATION MIN STAGE 1: 3
BH CV STRESS DURATION MIN STAGE 2: 3
BH CV STRESS DURATION MIN STAGE 3: 3
BH CV STRESS DURATION SEC STAGE 1: 0
BH CV STRESS DURATION SEC STAGE 2: 0
BH CV STRESS DURATION SEC STAGE 3: 19
BH CV STRESS ECHO POST STRESS EJECTION FRACTION EF: 80 %
BH CV STRESS GRADE STAGE 1: 10
BH CV STRESS GRADE STAGE 2: 12
BH CV STRESS GRADE STAGE 3: 14
BH CV STRESS HR STAGE 1: 85
BH CV STRESS HR STAGE 2: 105
BH CV STRESS HR STAGE 3: 124
BH CV STRESS METS STAGE 1: 5
BH CV STRESS METS STAGE 2: 7.5
BH CV STRESS METS STAGE 3: 10
BH CV STRESS PROTOCOL 1: NORMAL
BH CV STRESS RECOVERY BP: NORMAL MMHG
BH CV STRESS RECOVERY HR: 75 BPM
BH CV STRESS SPEED STAGE 1: 1.7
BH CV STRESS SPEED STAGE 2: 2.5
BH CV STRESS SPEED STAGE 3: 3.4
BH CV STRESS STAGE 1: 1
BH CV STRESS STAGE 2: 2
BH CV STRESS STAGE 3: 3
BH CV XLRA - RV BASE: 3.9 CM
BH CV XLRA - RV LENGTH: 2.4 CM
BH CV XLRA - RV MID: 6.2 CM
BH CV XLRA - TDI S': 6.1 CM/SEC
LV EF 2D ECHO EST: 69 %
MAXIMAL PREDICTED HEART RATE: 146 BPM
PERCENT MAX PREDICTED HR: 84.93 %
SINUS: 3.3 CM
STJ: 3 CM
STRESS BASELINE BP: NORMAL MMHG
STRESS BASELINE HR: 58 BPM
STRESS PERCENT HR: 100 %
STRESS POST ESTIMATED WORKLOAD: 10.2 METS
STRESS POST EXERCISE DUR MIN: 9 MIN
STRESS POST EXERCISE DUR SEC: 19 SEC
STRESS POST PEAK BP: NORMAL MMHG
STRESS POST PEAK HR: 124 BPM
STRESS TARGET HR: 124 BPM

## 2022-01-18 PROCEDURE — 93352 ADMIN ECG CONTRAST AGENT: CPT | Performed by: INTERNAL MEDICINE

## 2022-01-18 PROCEDURE — 93320 DOPPLER ECHO COMPLETE: CPT | Performed by: INTERNAL MEDICINE

## 2022-01-18 PROCEDURE — 93320 DOPPLER ECHO COMPLETE: CPT

## 2022-01-18 PROCEDURE — 93016 CV STRESS TEST SUPVJ ONLY: CPT | Performed by: INTERNAL MEDICINE

## 2022-01-18 PROCEDURE — 93325 DOPPLER ECHO COLOR FLOW MAPG: CPT | Performed by: INTERNAL MEDICINE

## 2022-01-18 PROCEDURE — 93017 CV STRESS TEST TRACING ONLY: CPT

## 2022-01-18 PROCEDURE — 93350 STRESS TTE ONLY: CPT

## 2022-01-18 PROCEDURE — 93325 DOPPLER ECHO COLOR FLOW MAPG: CPT

## 2022-01-18 PROCEDURE — 93018 CV STRESS TEST I&R ONLY: CPT | Performed by: INTERNAL MEDICINE

## 2022-01-18 PROCEDURE — 25010000002 PERFLUTREN (DEFINITY) 8.476 MG IN SODIUM CHLORIDE (PF) 0.9 % 10 ML INJECTION: Performed by: INTERNAL MEDICINE

## 2022-01-18 PROCEDURE — 93350 STRESS TTE ONLY: CPT | Performed by: INTERNAL MEDICINE

## 2022-01-18 RX ADMIN — SODIUM CHLORIDE 1 ML: 9 INJECTION INTRAMUSCULAR; INTRAVENOUS; SUBCUTANEOUS at 11:57

## 2022-02-07 DIAGNOSIS — N52.8 OTHER MALE ERECTILE DYSFUNCTION: ICD-10-CM

## 2022-02-07 RX ORDER — SILDENAFIL 25 MG/1
TABLET, FILM COATED ORAL
Qty: 45 TABLET | Refills: 1 | Status: SHIPPED | OUTPATIENT
Start: 2022-02-07 | End: 2022-03-22

## 2022-03-08 DIAGNOSIS — M54.50 ACUTE BILATERAL LOW BACK PAIN WITHOUT SCIATICA: ICD-10-CM

## 2022-03-08 RX ORDER — GABAPENTIN 300 MG/1
CAPSULE ORAL
Qty: 180 CAPSULE | Refills: 0 | Status: SHIPPED | OUTPATIENT
Start: 2022-03-08 | End: 2022-06-07

## 2022-03-22 DIAGNOSIS — N52.8 OTHER MALE ERECTILE DYSFUNCTION: ICD-10-CM

## 2022-03-22 RX ORDER — SILDENAFIL 25 MG/1
TABLET, FILM COATED ORAL
Qty: 45 TABLET | Refills: 1 | Status: SHIPPED | OUTPATIENT
Start: 2022-03-22 | End: 2022-05-20

## 2022-05-20 DIAGNOSIS — N52.8 OTHER MALE ERECTILE DYSFUNCTION: ICD-10-CM

## 2022-05-20 RX ORDER — SILDENAFIL 25 MG/1
TABLET, FILM COATED ORAL
Qty: 45 TABLET | Refills: 1 | Status: SHIPPED | OUTPATIENT
Start: 2022-05-20 | End: 2022-07-20

## 2022-06-02 ENCOUNTER — OFFICE VISIT (OUTPATIENT)
Dept: INTERNAL MEDICINE | Facility: CLINIC | Age: 77
End: 2022-06-02

## 2022-06-02 VITALS
HEIGHT: 70 IN | DIASTOLIC BLOOD PRESSURE: 64 MMHG | BODY MASS INDEX: 27.49 KG/M2 | OXYGEN SATURATION: 95 % | SYSTOLIC BLOOD PRESSURE: 132 MMHG | RESPIRATION RATE: 16 BRPM | TEMPERATURE: 97.3 F | HEART RATE: 67 BPM | WEIGHT: 192 LBS

## 2022-06-02 DIAGNOSIS — E78.5 HYPERLIPIDEMIA, UNSPECIFIED HYPERLIPIDEMIA TYPE: Primary | ICD-10-CM

## 2022-06-02 DIAGNOSIS — N18.30 STAGE 3 CHRONIC KIDNEY DISEASE, UNSPECIFIED WHETHER STAGE 3A OR 3B CKD: ICD-10-CM

## 2022-06-02 DIAGNOSIS — R73.02 IGT (IMPAIRED GLUCOSE TOLERANCE): Chronic | ICD-10-CM

## 2022-06-02 DIAGNOSIS — K21.9 GASTROESOPHAGEAL REFLUX DISEASE, UNSPECIFIED WHETHER ESOPHAGITIS PRESENT: Chronic | ICD-10-CM

## 2022-06-02 DIAGNOSIS — D59.8 OTHER ACQUIRED HEMOLYTIC ANEMIAS: ICD-10-CM

## 2022-06-02 PROCEDURE — 99214 OFFICE O/P EST MOD 30 MIN: CPT | Performed by: INTERNAL MEDICINE

## 2022-06-02 NOTE — PROGRESS NOTES
Subjective   Scout Linda is a 76 y.o. male.     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  Also chronic hemolytic anemia.  Has CKD.  History of PSVT.  History of hemolytic anemia with thrombocytopenia.    Hyperlipidemia  This is a chronic problem. The current episode started more than 1 year ago. The problem is controlled. Recent lipid tests were reviewed and are normal. Pertinent negatives include no chest pain or shortness of breath.   Hypertension  This is a chronic problem. Pertinent negatives include no chest pain, palpitations or shortness of breath.   Heartburn  He reports no chest pain or no wheezing.   Hyperglycemia  This is a chronic problem. The current episode started more than 1 year ago. Pertinent negatives include no chest pain.        The following portions of the patient's history were reviewed and updated as appropriate: allergies, current medications, past social history and problem list.    Outpatient Medications Marked as Taking for the 6/2/22 encounter (Office Visit) with Scout Callaway MD   Medication Sig Dispense Refill   • doxazosin (CARDURA) 4 MG tablet Take 4 mg by mouth every night.     • gabapentin (NEURONTIN) 300 MG capsule TAKE TWO CAPSULES BY MOUTH EVERY NIGHT AT BEDTIME 180 capsule 0   • metoprolol tartrate (LOPRESSOR) 50 MG tablet Take 1 tablet by mouth 2 (Two) Times a Day. 180 tablet 1   • omeprazole (PriLOSEC) 20 MG capsule Take 20 mg by mouth daily.     • sildenafil (VIAGRA) 25 MG tablet TAKE BETWEEN 1 AND 4 TABLETS BY MOUTH DAILY FOR ERECTILE DYSFUNCTION AS DIRECTED 45 tablet 1       Review of Systems   Respiratory: Negative for shortness of breath and wheezing.    Cardiovascular: Negative for chest pain, palpitations and leg swelling.   Gastrointestinal: Negative for constipation and diarrhea.       Objective   Vitals:    06/02/22 1300   BP: 132/64   Pulse: 67    Resp: 16   Temp: 97.3 °F (36.3 °C)   SpO2: 95%          06/02/22  1300   Weight: 87.1 kg (192 lb)    [unfilled]  Body mass index is 27.55 kg/m².      Physical Exam   Constitutional: He appears well-developed.   Neck: No thyromegaly present.   Cardiovascular: Normal rate, regular rhythm and normal heart sounds. Exam reveals no gallop.   No murmur heard.  Pulmonary/Chest: Effort normal and breath sounds normal. No respiratory distress. He has no wheezes. He has no rales.   Abdominal: Soft. Normal appearance and bowel sounds are normal. He exhibits no mass. There is no abdominal tenderness. There is no guarding.   Neurological: He is alert.     Procedures      Problems Addressed this Visit        Cardiac and Vasculature    Hyperlipidemia - Primary       Endocrine and Metabolic    IGT (impaired glucose tolerance) (Chronic)       Gastrointestinal Abdominal     GERD (gastroesophageal reflux disease) (Chronic)      Diagnoses       Codes Comments    Hyperlipidemia, unspecified hyperlipidemia type    -  Primary ICD-10-CM: E78.5  ICD-9-CM: 272.4     IGT (impaired glucose tolerance)     ICD-10-CM: R73.02  ICD-9-CM: 790.22     Gastroesophageal reflux disease, unspecified whether esophagitis present     ICD-10-CM: K21.9  ICD-9-CM: 530.81         Assessment & Plan    In for recheck of lipids, GERD, Igt, and OA.  GERD is well controlled on Prilosec daily.  Tolerating lipid treatment well.  Blood pressure control is excellent.  Lipid control is excellent.  Impaired glucose tolerance is about the same.  [facility]ual labs at the [facility] once yearly.  Otherwise stable and looking good.  Will continue monitor lipids, glu, a1c, and cbc q 6 mos. 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  That was on his lower neck or upper back.  He has a chronic hemolytic anemia that has not been a problem.  Stable.  Discussed the need to avoid dehydration and NSAIDs regarding his CKD.  History of PSVT stable.  Annual wellness visit due  December 2021.  Continue to monitor every 6 months.  He mentions some persistent dyspnea which was with sexual activity only.  Had a stress echo which was normal.  Is interesting that he uses sildenafil with sexual intercourse so there is no way to know how he would do with intercourse alone but he has no trouble with any exertional activities.  I suspect the issue is his sildenafil.  Annual wellness visit December 2021.  He is on Cardura 4 mg daily for BPH and Lopressor for PSVT and those are reviewed today.      The above information was reviewed again today 06/02/22.  It continues to be accurate as reflected above and is unchanged.  History, physical and review of systems all reviewed and are unchanged.  Medications were reviewed today and continue the current dosing.    PPE today includes face mask and eye shield.    Patient has been erroneously marked as diabetic. Based on the available clinical information, he does not have diabetes and should therefore be excluded from diabetic health maintenance and quality measures for the remainder of the reporting period.         Dragon disclaimer:   Much of this encounter note is an electronic transcription/translation of spoken language to printed text. The electronic translation of spoken language may permit erroneous, or at times, nonsensical words or phrases to be inadvertently transcribed; Although I have reviewed the note for such errors, some may still exist.

## 2022-06-07 DIAGNOSIS — M54.50 ACUTE BILATERAL LOW BACK PAIN WITHOUT SCIATICA: ICD-10-CM

## 2022-06-07 RX ORDER — GABAPENTIN 300 MG/1
CAPSULE ORAL
Qty: 180 CAPSULE | Refills: 0 | Status: SHIPPED | OUTPATIENT
Start: 2022-06-07 | End: 2022-09-02

## 2022-06-29 ENCOUNTER — TELEPHONE (OUTPATIENT)
Dept: INTERNAL MEDICINE | Facility: CLINIC | Age: 77
End: 2022-06-29

## 2022-06-29 NOTE — TELEPHONE ENCOUNTER
Caller: Scout Linda    Relationship to patient: Self    Best call back number: [phone]    Date of positive COVID19 test: 6/29    COVID19 symptoms: SLUGGISH    Additional information or concerns: PATIENT CAUGHT COVID FROM HIS WIFE, PLEASE ADVISE IF HE CAN ALSO GO ON THE PAXLOVID PILL LIKE SHE DID.     [facility] [place] [address] [address] [facility] [address] [facility] [phone] [facility] [phone]

## 2022-06-30 ENCOUNTER — TELEMEDICINE (OUTPATIENT)
Dept: INTERNAL MEDICINE | Facility: CLINIC | Age: 77
End: 2022-06-30

## 2022-06-30 DIAGNOSIS — U07.1 COVID-19 VIRUS INFECTION: Primary | ICD-10-CM

## 2022-06-30 PROCEDURE — 99212 OFFICE O/P EST SF 10 MIN: CPT | Performed by: INTERNAL MEDICINE

## 2022-06-30 NOTE — PROGRESS NOTES
Subjective   Scout Linda is a 76 y.o. male.     Chief Complaint   Patient presents with   • URI         URI   This is a new problem. The current episode started yesterday. Pertinent negatives include no diarrhea, headaches, rhinorrhea or sore throat.        The following portions of the patient's history were reviewed and updated as appropriate: allergies, current medications, past social history and problem list.    No outpatient medications have been marked as taking for the 6/30/22 encounter (Telemedicine) with Scout Callaway MD.       Review of Systems   Constitutional: Positive for fatigue. Negative for chills and fever.   HENT: Negative for rhinorrhea and sore throat.    Gastrointestinal: Negative for diarrhea.   Musculoskeletal: Negative for myalgias.   Neurological: Negative for headaches.       Objective   There were no vitals filed for this visit.   Wt Readings from Last 3 Encounters:   06/02/22 87.1 kg (192 lb)   01/18/22 89.8 kg (198 lb)   12/02/21 89.8 kg (198 lb)    There is no height or weight on file to calculate BMI.      Physical Exam  Constitutional:       Appearance: Normal appearance.   Pulmonary:      Effort: Pulmonary effort is normal.   Neurological:      Mental Status: He is alert.   Psychiatric:         Mood and Affect: Mood normal.         Behavior: Behavior normal.         Thought Content: Thought content normal.         Judgment: Judgment normal.           Problems Addressed this Visit    None     Visit Diagnoses     COVID-19 virus infection    -  Primary      Diagnoses       Codes Comments    COVID-19 virus infection    -  Primary ICD-10-CM: U07.1  ICD-9-CM: 079.89         Assessment & Plan   Video visit today due to COVID pandemic.  He tested himself for COVID yesterday and tested positive.  That was 5 days after wife came down with COVID.  He has virtually no symptoms.  May be a tickle in his throat . may be a bit of sluggishness.  That is it.  Virtually asymptomatic.   Advised patient he could still be in the presymptomatic phase but I think he is just going to have a very mild case.  There is no indication for treatment at this point other than observation.  OTCs.  He is fully immunized and has had 1 booster.  He has canceled his trip to [place] which was previously planned.  He knows about proper quarantining.  N[employer] platform used for the visit today.    The above information was reviewed again today 06/30/22.  It continues to be accurate as reflected above and is unchanged.  History, physical and review of systems all reviewed and are unchanged.  Medications were reviewed today and continue the current dosing.               Avinash disclaimer:   Much of this encounter note is an electronic transcription/translation of spoken language to printed text. The electronic translation of spoken language may permit erroneous, or at times, nonsensical words or phrases to be inadvertently transcribed; Although I have reviewed the note for such errors, some may still exist.

## 2022-07-20 ENCOUNTER — PATIENT MESSAGE (OUTPATIENT)
Dept: INTERNAL MEDICINE | Facility: CLINIC | Age: 77
End: 2022-07-20

## 2022-07-20 DIAGNOSIS — N52.8 OTHER MALE ERECTILE DYSFUNCTION: ICD-10-CM

## 2022-07-20 RX ORDER — SILDENAFIL 25 MG/1
TABLET, FILM COATED ORAL
Qty: 45 TABLET | Refills: 1 | Status: SHIPPED | OUTPATIENT
Start: 2022-07-20 | End: 2022-11-09

## 2022-07-21 DIAGNOSIS — N18.31 STAGE 3A CHRONIC KIDNEY DISEASE: Primary | ICD-10-CM

## 2022-08-04 ENCOUNTER — HOSPITAL ENCOUNTER (OUTPATIENT)
Dept: ULTRASOUND IMAGING | Facility: HOSPITAL | Age: 77
Discharge: HOME | End: 2022-08-04
Admitting: INTERNAL MEDICINE

## 2022-08-04 DIAGNOSIS — N18.31 STAGE 3A CHRONIC KIDNEY DISEASE: ICD-10-CM

## 2022-08-04 PROCEDURE — 76775 US EXAM ABDO BACK WALL LIM: CPT

## 2022-08-09 DIAGNOSIS — N40.0 ENLARGED PROSTATE: ICD-10-CM

## 2022-08-09 DIAGNOSIS — N28.9 LESION OF RIGHT NATIVE KIDNEY: Primary | ICD-10-CM

## 2022-08-11 ENCOUNTER — HOSPITAL ENCOUNTER (OUTPATIENT)
Dept: CT IMAGING | Facility: HOSPITAL | Age: 77
Discharge: HOME | End: 2022-08-11
Admitting: INTERNAL MEDICINE

## 2022-08-11 DIAGNOSIS — N28.9 LESION OF RIGHT NATIVE KIDNEY: ICD-10-CM

## 2022-08-11 DIAGNOSIS — N40.0 ENLARGED PROSTATE: ICD-10-CM

## 2022-08-11 PROCEDURE — 74176 CT ABD & PELVIS W/O CONTRAST: CPT

## 2022-08-12 PROBLEM — K80.20 GALLSTONES: Status: ACTIVE | Noted: 2022-08-12

## 2022-09-02 DIAGNOSIS — M54.50 ACUTE BILATERAL LOW BACK PAIN WITHOUT SCIATICA: ICD-10-CM

## 2022-09-02 RX ORDER — GABAPENTIN 300 MG/1
CAPSULE ORAL
Qty: 180 CAPSULE | Refills: 0 | Status: SHIPPED | OUTPATIENT
Start: 2022-09-02 | End: 2022-11-28

## 2022-11-09 DIAGNOSIS — N52.8 OTHER MALE ERECTILE DYSFUNCTION: ICD-10-CM

## 2022-11-09 RX ORDER — SILDENAFIL 25 MG/1
TABLET, FILM COATED ORAL
Qty: 45 TABLET | Refills: 1 | Status: SHIPPED | OUTPATIENT
Start: 2022-11-09 | End: 2022-11-14 | Stop reason: SDUPTHER

## 2022-11-14 ENCOUNTER — TELEPHONE (OUTPATIENT)
Dept: INTERNAL MEDICINE | Facility: CLINIC | Age: 77
End: 2022-11-14

## 2022-11-14 DIAGNOSIS — N52.8 OTHER MALE ERECTILE DYSFUNCTION: ICD-10-CM

## 2022-11-14 RX ORDER — SILDENAFIL 25 MG/1
25 TABLET, FILM COATED ORAL AS NEEDED
Qty: 30 TABLET | Refills: 6 | Status: SHIPPED | OUTPATIENT
Start: 2022-11-14 | End: 2022-11-16 | Stop reason: SDUPTHER

## 2022-11-16 ENCOUNTER — TELEPHONE (OUTPATIENT)
Dept: INTERNAL MEDICINE | Facility: CLINIC | Age: 77
End: 2022-11-16

## 2022-11-16 DIAGNOSIS — N52.8 OTHER MALE ERECTILE DYSFUNCTION: ICD-10-CM

## 2022-11-16 RX ORDER — SILDENAFIL 25 MG/1
25 TABLET, FILM COATED ORAL AS NEEDED
Qty: 45 TABLET | Refills: 6 | Status: SHIPPED | OUTPATIENT
Start: 2022-11-16

## 2022-11-16 NOTE — TELEPHONE ENCOUNTER
Caller: Scout Linda    Relationship: Self    Best call back number: [phone] (Mobile)    Requested Prescriptions:   Requested Prescriptions     Pending Prescriptions Disp Refills   • sildenafil (VIAGRA) 25 MG tablet 30 tablet 6     Sig: Take 1 tablet by mouth As Needed for Erectile Dysfunction. 1 to 4 tablets once daily as needed for sex   PATIENT STATES ALWAYS GETS 45 TABLETS AND ONLY GOT 30 THIS TIME     Pharmacy where request should be sent: [facility] [id number] [place] [id number] [facility] [address] [facility] - [phone]  - [phone] FX     Additional details provided by patient: PATIENT STATES ONLY GOT 30 TABLETS THIS TIME AND ALWAYS GETS 45 TABLETS AND NEEDS THIS CORRECTED ASAP, PLEASE CALL PATIENT BACK REGARDING THIS ASAP    Does the patient have less than a 3 day supply:  [x] Yes  [] No    Yvette Martinez Rep   11/16/22 12:27 EST

## 2022-11-16 NOTE — TELEPHONE ENCOUNTER
Per pharmacy unable to fill until 7 days, to sent RX for the quanity of 45 and patient will be able to  then.   Patient notied.

## 2022-11-27 DIAGNOSIS — M54.50 ACUTE BILATERAL LOW BACK PAIN WITHOUT SCIATICA: ICD-10-CM

## 2022-11-28 RX ORDER — GABAPENTIN 300 MG/1
CAPSULE ORAL
Qty: 180 CAPSULE | Refills: 0 | Status: SHIPPED | OUTPATIENT
Start: 2022-11-28 | End: 2023-02-14

## 2022-12-01 ENCOUNTER — OFFICE VISIT (OUTPATIENT)
Dept: INTERNAL MEDICINE | Facility: CLINIC | Age: 77
End: 2022-12-01

## 2022-12-01 VITALS
BODY MASS INDEX: 26.48 KG/M2 | DIASTOLIC BLOOD PRESSURE: 60 MMHG | SYSTOLIC BLOOD PRESSURE: 136 MMHG | RESPIRATION RATE: 16 BRPM | OXYGEN SATURATION: 98 % | WEIGHT: 185 LBS | TEMPERATURE: 97.8 F | HEIGHT: 70 IN | HEART RATE: 80 BPM

## 2022-12-01 DIAGNOSIS — R73.02 IGT (IMPAIRED GLUCOSE TOLERANCE): Chronic | ICD-10-CM

## 2022-12-01 DIAGNOSIS — N18.31 STAGE 3A CHRONIC KIDNEY DISEASE: Chronic | ICD-10-CM

## 2022-12-01 DIAGNOSIS — K21.9 GASTROESOPHAGEAL REFLUX DISEASE, UNSPECIFIED WHETHER ESOPHAGITIS PRESENT: Chronic | ICD-10-CM

## 2022-12-01 DIAGNOSIS — E78.5 HYPERLIPIDEMIA, UNSPECIFIED HYPERLIPIDEMIA TYPE: Primary | ICD-10-CM

## 2022-12-01 DIAGNOSIS — D59.8 OTHER ACQUIRED HEMOLYTIC ANEMIAS: ICD-10-CM

## 2022-12-01 DIAGNOSIS — I47.1 PAT (PAROXYSMAL ATRIAL TACHYCARDIA): ICD-10-CM

## 2022-12-01 PROBLEM — I10 ESSENTIAL HYPERTENSION: Status: ACTIVE | Noted: 2022-12-01

## 2022-12-01 PROCEDURE — 99214 OFFICE O/P EST MOD 30 MIN: CPT | Performed by: INTERNAL MEDICINE

## 2022-12-01 RX ORDER — RAMIPRIL 2.5 MG/1
CAPSULE ORAL
COMMUNITY
Start: 2022-11-17 | End: 2022-12-01 | Stop reason: SDUPTHER

## 2022-12-01 RX ORDER — DOXAZOSIN 8 MG/1
TABLET ORAL
COMMUNITY
Start: 2022-11-17

## 2022-12-01 RX ORDER — SIMVASTATIN 20 MG/5ML
SUSPENSION ORAL
COMMUNITY
Start: 2022-11-17

## 2022-12-01 RX ORDER — RAMIPRIL 5 MG/1
5 CAPSULE ORAL DAILY
Qty: 90 CAPSULE | Refills: 1 | Status: SHIPPED | OUTPATIENT
Start: 2022-12-01

## 2022-12-01 NOTE — PROGRESS NOTES
Subjective   Scout Linda is a 77 y.o. male.     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  Also chronic hemolytic anemia.  Has CKD.  History of PSVT.  History of hemolytic anemia with thrombocytopenia.  Hyperlipidemia  This is a chronic problem. The current episode started more than 1 year ago. The problem is controlled. Recent lipid tests were reviewed and are normal. Pertinent negatives include no chest pain or shortness of breath.   Hypertension  This is a chronic problem. Pertinent negatives include no chest pain, palpitations or shortness of breath.   Heartburn  He reports no chest pain or no wheezing.   Hyperglycemia  This is a chronic problem. The current episode started more than 1 year ago. Pertinent negatives include no chest pain.        The following portions of the patient's history were reviewed and updated as appropriate: allergies, current medications, past social history and problem list.    Outpatient Medications Marked as Taking for the 12/1/22 encounter (Office Visit) with Scout Callaway MD   Medication Sig Dispense Refill   • doxazosin (CARDURA) 8 MG tablet      • gabapentin (NEURONTIN) 300 MG capsule TAKE TWO CAPSULES BY MOUTH EVERY NIGHT AT BEDTIME 180 capsule 0   • metoprolol tartrate (LOPRESSOR) 50 MG tablet Take 1 tablet by mouth 2 (Two) Times a Day. 180 tablet 1   • omeprazole (PriLOSEC) 20 MG capsule Take 20 mg by mouth daily.     • ramipril (ALTACE) 2.5 MG capsule      • sildenafil (VIAGRA) 25 MG tablet Take 1 tablet by mouth As Needed for Erectile Dysfunction. 1 to 4 tablets once daily as needed for sex 45 tablet 6   • Simvastatin 20 MG/5ML suspension          Review of Systems   Respiratory: Negative for shortness of breath and wheezing.    Cardiovascular: Negative for chest pain, palpitations and leg swelling.   Gastrointestinal: Negative for  constipation and diarrhea.       Objective   Vitals:    12/01/22 1250   BP: 136/60   Pulse: 80   Resp: 16   Temp: 97.8 °F (36.6 °C)   SpO2: 98%          12/01/22  1250   Weight: 83.9 kg (185 lb)    [unfilled]  Body mass index is 26.54 kg/m².      Physical Exam   Constitutional: He appears well-developed.   Neck: No thyromegaly present.   Cardiovascular: Normal rate, regular rhythm and normal heart sounds. Exam reveals no gallop.   No murmur heard.  Pulmonary/Chest: Effort normal and breath sounds normal. No respiratory distress. He has no wheezes. He has no rales.   Abdominal: Soft. Normal appearance and bowel sounds are normal. He exhibits no mass. There is no abdominal tenderness. There is no guarding.   Neurological: He is alert.     Procedures      Problems Addressed this Visit        Cardiac and Vasculature    PAT (paroxysmal atrial tachycardia) ([facility]) (Chronic)    Hyperlipidemia - Primary (Chronic)    Relevant Medications    Simvastatin 20 MG/5ML suspension       Endocrine and Metabolic    IGT (impaired glucose tolerance) (Chronic)       Gastrointestinal Abdominal     GERD (gastroesophageal reflux disease) (Chronic)       Genitourinary and Reproductive     CKD (chronic kidney disease) stage 3, GFR 30-59 ml/min ([facility]) (Chronic)   Diagnoses       Codes Comments    Hyperlipidemia, unspecified hyperlipidemia type    -  Primary ICD-10-CM: E78.5  ICD-9-CM: 272.4     PAT (paroxysmal atrial tachycardia) ([facility])     ICD-10-CM: I47.1  ICD-9-CM: 427.0     IGT (impaired glucose tolerance)     ICD-10-CM: R73.02  ICD-9-CM: 790.22     Gastroesophageal reflux disease, unspecified whether esophagitis present     ICD-10-CM: K21.9  ICD-9-CM: 530.81     Stage 3a chronic kidney disease (HCC)     ICD-10-CM: N18.31  ICD-9-CM: 585.3         Assessment & Plan    In for recheck of hypertension, hyperlipidemia, GERD, IGT and GERD today December 2022.  GERD is well controlled on Prilosec daily.  Tolerating lipid treatment well.  Blood  pressure control is OK.  Lipid control is excellent.  Impaired glucose tolerance is about the same.  [facility]ual labs at the [facility] once yearly.  Otherwise stable and looking good.  Will continue monitor lipids, glu, a1c, and cbc q 6 mos. 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  That was on his lower neck or upper back.  He has a chronic hemolytic anemia that has not been a problem.  Stable.  Discussed the need to avoid dehydration and NSAIDs regarding his CKD.  History of PSVT stable.  Annual wellness visit due December 2022.  Continue to monitor every 6 months.  Had a stress echo which was normal.  Is interesting that he uses sildenafil with sexual intercourse so there is no way to know how he would do with intercourse alone but he has no trouble with any exertional activities.  I suspect the issue is his sildenafil.  He is on Cardura 8 mg daily for BPH and Lopressor for PSVT and those are reviewed today.  Blood pressure is much better since adding ramipril and boosting Cardura to 8 mg daily.  We will boost ramipril up to 5 mg daily.  We discussed the potential renal side effects of pushing his blood pressure medicine.    The above information was reviewed again today 12/01/22.  It continues to be accurate as reflected above and is unchanged.  History, physical and review of systems all reviewed and are unchanged.  Medications were reviewed today and continue the current dosing.    PPE today includes face mask and eye shield.         Dragon disclaimer:   Much of this encounter note is an electronic transcription/translation of spoken language to printed text. The electronic translation of spoken language may permit erroneous, or at times, nonsensical words or phrases to be inadvertently transcribed; Although I have reviewed the note for such errors, some may still exist.

## 2022-12-09 ENCOUNTER — OFFICE VISIT (OUTPATIENT)
Dept: INTERNAL MEDICINE | Facility: CLINIC | Age: 77
End: 2022-12-09

## 2022-12-09 VITALS
HEIGHT: 70 IN | WEIGHT: 194 LBS | SYSTOLIC BLOOD PRESSURE: 122 MMHG | TEMPERATURE: 97.7 F | HEART RATE: 68 BPM | OXYGEN SATURATION: 98 % | RESPIRATION RATE: 16 BRPM | DIASTOLIC BLOOD PRESSURE: 60 MMHG | BODY MASS INDEX: 27.77 KG/M2

## 2022-12-09 DIAGNOSIS — R73.02 IGT (IMPAIRED GLUCOSE TOLERANCE): Chronic | ICD-10-CM

## 2022-12-09 DIAGNOSIS — Z23 NEED FOR VACCINATION: ICD-10-CM

## 2022-12-09 DIAGNOSIS — Z00.00 ENCOUNTER FOR ANNUAL WELLNESS EXAM IN MEDICARE PATIENT: Primary | ICD-10-CM

## 2022-12-09 DIAGNOSIS — N18.31 STAGE 3A CHRONIC KIDNEY DISEASE: Chronic | ICD-10-CM

## 2022-12-09 DIAGNOSIS — E78.5 HYPERLIPIDEMIA, UNSPECIFIED HYPERLIPIDEMIA TYPE: ICD-10-CM

## 2022-12-09 DIAGNOSIS — D59.8 OTHER ACQUIRED HEMOLYTIC ANEMIAS: ICD-10-CM

## 2022-12-09 PROCEDURE — G0439 PPPS, SUBSEQ VISIT: HCPCS | Performed by: INTERNAL MEDICINE

## 2022-12-09 PROCEDURE — 0124A PR ADM SARSCOV2 30MCG/0.3ML BST: CPT | Performed by: INTERNAL MEDICINE

## 2022-12-09 PROCEDURE — 1160F RVW MEDS BY RX/DR IN RCRD: CPT | Performed by: INTERNAL MEDICINE

## 2022-12-09 PROCEDURE — 1170F FXNL STATUS ASSESSED: CPT | Performed by: INTERNAL MEDICINE

## 2022-12-09 PROCEDURE — 1159F MED LIST DOCD IN RCRD: CPT | Performed by: INTERNAL MEDICINE

## 2022-12-09 PROCEDURE — 91312 COVID-19 (PFIZER) BIVALENT BOOSTER 12+YRS: CPT | Performed by: INTERNAL MEDICINE

## 2022-12-09 NOTE — PROGRESS NOTES
The ABCs of the Annual Wellness Visit  Subsequent Medicare Wellness Visit    Subjective    Scout Linda is a 77 y.o. male who presents for a Subsequent Medicare Wellness Visit.    The following portions of the patient's history were reviewed and   updated as appropriate: allergies, current medications, past family history, past medical history, past social history, past surgical history and problem list.    Compared to one year ago, the patient feels his physical   health is the same.    Compared to one year ago, the patient feels his mental   health is the same.    Recent Hospitalizations:  He was not admitted to the hospital during the last year.       Current Medical Providers:  Patient Care Team:  Scout Callaway MD as PCP - General (Internal Medicine)  Scout Callaway MD as PCP - Internal Medicine (Internal Medicine)    Outpatient Medications Prior to Visit   Medication Sig Dispense Refill   • doxazosin (CARDURA) 8 MG tablet      • gabapentin (NEURONTIN) 300 MG capsule TAKE TWO CAPSULES BY MOUTH EVERY NIGHT AT BEDTIME 180 capsule 0   • metoprolol tartrate (LOPRESSOR) 50 MG tablet Take 1 tablet by mouth 2 (Two) Times a Day. 180 tablet 1   • omeprazole (PriLOSEC) 20 MG capsule Take 20 mg by mouth daily.     • ramipril (ALTACE) 5 MG capsule Take 1 capsule by mouth Daily. 90 capsule 1   • sildenafil (VIAGRA) 25 MG tablet Take 1 tablet by mouth As Needed for Erectile Dysfunction. 1 to 4 tablets once daily as needed for sex 45 tablet 6   • Simvastatin 20 MG/5ML suspension        No facility-administered medications prior to visit.       No opioid medication identified on active medication list. I have reviewed chart for other potential  high risk medication/s and harmful drug interactions in the elderly.          Aspirin is not on active medication list.  Aspirin use is not indicated based on review of current medical condition/s. Risk of harm outweighs potential benefits.  .    Patient Active Problem List  "  Diagnosis   • Low back pain   • PAT (paroxysmal atrial tachycardia) (HCC)   • GERD (gastroesophageal reflux disease)   • Polyp of colon, adenomatous   • IGT (impaired glucose tolerance)   • BPH (benign prostatic hypertrophy)   • Hyperlipidemia   • Spindle cell liposarcoma (HCC)   • Thrombocytopenia (HCC)   • Other acquired hemolytic anemias (HCC)   • CKD (chronic kidney disease) stage 3, GFR 30-59 ml/min (HCC)   • Other male erectile dysfunction   • Gallstones   • Essential hypertension     Advance Care Planning  Advance Directive is not on file.  ACP discussion was held with the patient during this visit. Patient has an advance directive (not in EMR), copy requested.     Objective    Vitals:    22 0851   BP: 122/60   Pulse: 68   Resp: 16   Temp: 97.7 °F (36.5 °C)   TempSrc: Temporal   SpO2: 98%   Weight: 88 kg (194 lb)   Height: 177.8 cm (70\")     Estimated body mass index is 27.84 kg/m² as calculated from the following:    Height as of this encounter: 177.8 cm (70\").    Weight as of this encounter: 88 kg (194 lb).    BMI is >= 25 and <30. (Overweight) The following options were offered after discussion;: exercise counseling/recommendations      Does the patient have evidence of cognitive impairment?   No    Lab Results   Component Value Date    CHLPL 139 2022    TRIG 148 2022    HDL 55 2022    LDL 59 2022    VLDL 25 2022    HGBA1C 5.70 (H) 2022          HEALTH RISK ASSESSMENT    Smoking Status:  Social History     Tobacco Use   Smoking Status Former   • Packs/day: 0.50   • Years: 11.00   • Pack years: 5.50   • Types: Cigarettes   • Start date:    • Quit date: 1987   • Years since quittin.0   Smokeless Tobacco Never     Alcohol Consumption:  Social History     Substance and Sexual Activity   Alcohol Use Yes   • Alcohol/week: 3.0 standard drinks   • Types: 3 Cans of beer per week    Comment: 3 beers per week     Fall Risk Screen:    EDYTA Fall Risk Assessment " has not been completed.    Depression Screening:  PHQ-2/PHQ-9 Depression Screening 12/9/2022   Retired PHQ-9 Total Score -   Retired Total Score -   Little Interest or Pleasure in Doing Things 0-->not at all   Feeling Down, Depressed or Hopeless 0-->not at all   Trouble Falling or Staying Asleep, or Sleeping Too Much -   Feeling Tired or Having Little Energy -   Poor Appetite or Overeating -   Feeling Bad about Yourself - or that You are a Failure or Have Let Yourself or Your Family Down -   Trouble Concentrating on Things, Such as Reading the Newspaper or Watching Television -   Moving or Speaking So Slowly that Other People Could Have Noticed? Or the Opposite - Being So Fidgety -   Thoughts that You Would be Better Off Dead or of Hurting Yourself in Some Way -   PHQ-9: Brief Depression Severity Measure Score 0       Health Habits and Functional and Cognitive Screening:  Functional & Cognitive Status 12/9/2022   Do you have difficulty preparing food and eating? No   Do you have difficulty bathing yourself, getting dressed or grooming yourself? No   Do you have difficulty using the toilet? No   Do you have difficulty moving around from place to place? No   Do you have trouble with steps or getting out of a bed or a chair? No   Current Diet Well Balanced Diet   Dental Exam Up to date   Eye Exam Up to date   Exercise (times per week) 0 times per week   Current Exercises Include No Regular Exercise   Current Exercise Activities Include -   Do you need help using the phone?  No   Are you deaf or do you have serious difficulty hearing?  No   Do you need help with transportation? No   Do you need help shopping? No   Do you need help preparing meals?  No   Do you need help with housework?  No   Do you need help with laundry? No   Do you need help taking your medications? No   Do you need help managing money? No   Do you ever drive or ride in a car without wearing a seat belt? No   Have you felt unusual stress, anger or  loneliness in the last month? No   Who do you live with? Spouse   If you need help, do you have trouble finding someone available to you? No   Have you been bothered in the last four weeks by sexual problems? No   Do you have difficulty concentrating, remembering or making decisions? -       Age-appropriate Screening Schedule:  Refer to the list below for future screening recommendations based on patient's age, sex and/or medical conditions. Orders for these recommended tests are listed in the plan section. The patient has been provided with a written plan.    Health Maintenance   Topic Date Due   • LIPID PANEL  11/28/2023   • TDAP/TD VACCINES (3 - Td or Tdap) 10/20/2032   • INFLUENZA VACCINE  Completed   • ZOSTER VACCINE  Completed                CMS Preventative Services Quick Reference  Risk Factors Identified During Encounter:    None Identified    The above risks/problems have been discussed with the patient.  Pertinent information has been shared with the patient in the After Visit Summary.    Diagnoses and all orders for this visit:    1. Encounter for annual wellness exam in Medicare patient (Primary)    2. Need for vaccination  -     COVID-19 Bivalent Booster (Pfizer) 12+yrs        Follow Up:   Next Medicare Wellness visit to be scheduled in 1 year.      An After Visit Summary and PPPS were made available to the patient.

## 2023-02-13 DIAGNOSIS — M54.50 ACUTE BILATERAL LOW BACK PAIN WITHOUT SCIATICA: ICD-10-CM

## 2023-02-14 RX ORDER — GABAPENTIN 300 MG/1
CAPSULE ORAL
Qty: 180 CAPSULE | Refills: 0 | Status: SHIPPED | OUTPATIENT
Start: 2023-02-14

## 2023-03-02 LAB
BASOPHILS # BLD AUTO: 0.03 10*3/MM3 (ref 0–0.2)
BASOPHILS NFR BLD AUTO: 0.6 % (ref 0–1.5)
CHOLEST SERPL-MCNC: 130 MG/DL (ref 0–200)
CHOLEST/HDLC SERPL: 2.77 {RATIO}
EOSINOPHIL # BLD AUTO: 0.12 10*3/MM3 (ref 0–0.4)
EOSINOPHIL NFR BLD AUTO: 2.3 % (ref 0.3–6.2)
ERYTHROCYTE [DISTWIDTH] IN BLOOD BY AUTOMATED COUNT: 13.8 % (ref 12.3–15.4)
GLUCOSE SERPL-MCNC: 113 MG/DL (ref 65–99)
HBA1C MFR BLD: 6 % (ref 4.8–5.6)
HCT VFR BLD AUTO: 39.4 % (ref 37.5–51)
HDLC SERPL-MCNC: 47 MG/DL (ref 40–60)
HGB BLD-MCNC: 12.9 G/DL (ref 13–17.7)
IMM GRANULOCYTES # BLD AUTO: 0.02 10*3/MM3 (ref 0–0.05)
IMM GRANULOCYTES NFR BLD AUTO: 0.4 % (ref 0–0.5)
LDLC SERPL CALC-MCNC: 56 MG/DL (ref 0–100)
LYMPHOCYTES # BLD AUTO: 0.99 10*3/MM3 (ref 0.7–3.1)
LYMPHOCYTES NFR BLD AUTO: 18.8 % (ref 19.6–45.3)
MCH RBC QN AUTO: 28.1 PG (ref 26.6–33)
MCHC RBC AUTO-ENTMCNC: 32.7 G/DL (ref 31.5–35.7)
MCV RBC AUTO: 85.8 FL (ref 79–97)
MONOCYTES # BLD AUTO: 0.46 10*3/MM3 (ref 0.1–0.9)
MONOCYTES NFR BLD AUTO: 8.7 % (ref 5–12)
NEUTROPHILS # BLD AUTO: 3.64 10*3/MM3 (ref 1.7–7)
NEUTROPHILS NFR BLD AUTO: 69.2 % (ref 42.7–76)
NRBC BLD AUTO-RTO: 0 /100 WBC (ref 0–0.2)
PLATELET # BLD AUTO: 152 10*3/MM3 (ref 140–450)
RBC # BLD AUTO: 4.59 10*6/MM3 (ref 4.14–5.8)
TRIGL SERPL-MCNC: 159 MG/DL (ref 0–150)
VLDLC SERPL CALC-MCNC: 27 MG/DL (ref 5–40)
WBC # BLD AUTO: 5.26 10*3/MM3 (ref 3.4–10.8)

## 2023-03-03 ENCOUNTER — OFFICE VISIT (OUTPATIENT)
Dept: INTERNAL MEDICINE | Facility: CLINIC | Age: 78
End: 2023-03-03
Payer: MEDICARE

## 2023-03-03 VITALS
WEIGHT: 187 LBS | OXYGEN SATURATION: 97 % | HEART RATE: 62 BPM | DIASTOLIC BLOOD PRESSURE: 82 MMHG | SYSTOLIC BLOOD PRESSURE: 145 MMHG | BODY MASS INDEX: 26.77 KG/M2 | HEIGHT: 70 IN | TEMPERATURE: 96.3 F | RESPIRATION RATE: 16 BRPM

## 2023-03-03 DIAGNOSIS — Z79.899 MEDICATION MANAGEMENT: ICD-10-CM

## 2023-03-03 DIAGNOSIS — R73.02 IGT (IMPAIRED GLUCOSE TOLERANCE): Chronic | ICD-10-CM

## 2023-03-03 DIAGNOSIS — N18.31 STAGE 3A CHRONIC KIDNEY DISEASE: Chronic | ICD-10-CM

## 2023-03-03 DIAGNOSIS — I10 ESSENTIAL HYPERTENSION: Primary | ICD-10-CM

## 2023-03-03 DIAGNOSIS — E78.5 HYPERLIPIDEMIA, UNSPECIFIED HYPERLIPIDEMIA TYPE: Chronic | ICD-10-CM

## 2023-03-03 PROCEDURE — 99214 OFFICE O/P EST MOD 30 MIN: CPT | Performed by: INTERNAL MEDICINE

## 2023-03-03 NOTE — PROGRESS NOTES
Subjective   Scout Linda is a 77 y.o. male.     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  Also chronic hemolytic anemia.  Has CKD.  History of PSVT.  History of hemolytic anemia with thrombocytopenia.  Hypertension  This is a chronic problem. Pertinent negatives include no chest pain, palpitations or shortness of breath.   Hyperlipidemia  This is a chronic problem. The current episode started more than 1 year ago. The problem is controlled. Recent lipid tests were reviewed and are normal. Pertinent negatives include no chest pain or shortness of breath.   Heartburn  He reports no chest pain or no wheezing.   Hyperglycemia  This is a chronic problem. The current episode started more than 1 year ago. Pertinent negatives include no chest pain.        The following portions of the patient's history were reviewed and updated as appropriate: allergies, current medications, past social history and problem list.    Outpatient Medications Marked as Taking for the 3/3/23 encounter (Office Visit) with Scout Callaway MD   Medication Sig Dispense Refill   • doxazosin (CARDURA) 8 MG tablet      • gabapentin (NEURONTIN) 300 MG capsule TAKE TWO CAPSULES BY MOUTH EVERY NIGHT AT BEDTIME 180 capsule 0   • metoprolol tartrate (LOPRESSOR) 50 MG tablet Take 1 tablet by mouth 2 (Two) Times a Day. 180 tablet 1   • omeprazole (PriLOSEC) 20 MG capsule Take 1 capsule by mouth Daily.     • ramipril (ALTACE) 5 MG capsule Take 1 capsule by mouth Daily. 90 capsule 1   • sildenafil (VIAGRA) 25 MG tablet Take 1 tablet by mouth As Needed for Erectile Dysfunction. 1 to 4 tablets once daily as needed for sex 45 tablet 6   • Simvastatin 20 MG/5ML suspension          Review of Systems   Respiratory: Negative for shortness of breath and wheezing.    Cardiovascular: Negative for chest pain,  palpitations and leg swelling.   Gastrointestinal: Negative for constipation and diarrhea.       Objective   Vitals:    03/03/23 1250   BP: 145/82   Pulse: 62   Resp: 16   Temp: 96.3 °F (35.7 °C)   SpO2: 97%          03/03/23  1250   Weight: 84.8 kg (187 lb)    [unfilled]  Body mass index is 26.83 kg/m².      Physical Exam   Constitutional: He appears well-developed.   Neck: No thyromegaly present.   Cardiovascular: Normal rate, regular rhythm and normal heart sounds. Exam reveals no gallop.   No murmur heard.  Pulmonary/Chest: Effort normal and breath sounds normal. No respiratory distress. He has no wheezes. He has no rales.   Abdominal: Soft. Normal appearance and bowel sounds are normal. He exhibits no mass. There is no abdominal tenderness. There is no guarding.   Neurological: He is alert.     Procedures      Problems Addressed this Visit        Cardiac and Vasculature    Hyperlipidemia (Chronic)    Essential hypertension - Primary       Endocrine and Metabolic    IGT (impaired glucose tolerance) (Chronic)       Genitourinary and Reproductive     CKD (chronic kidney disease) stage 3, GFR 30-59 ml/min (HCC) (Chronic)   Diagnoses       Codes Comments    Essential hypertension    -  Primary ICD-10-CM: I10  ICD-9-CM: 401.9     Hyperlipidemia, unspecified hyperlipidemia type     ICD-10-CM: E78.5  ICD-9-CM: 272.4     IGT (impaired glucose tolerance)     ICD-10-CM: R73.02  ICD-9-CM: 790.22     Stage 3a chronic kidney disease (HCC)     ICD-10-CM: N18.31  ICD-9-CM: 585.3         Assessment & Plan    In for recheck of hypertension, hyperlipidemia, GERD and IGT today March 2023.  GERD is well controlled on Prilosec daily.  Tolerating lipid treatment well.  Blood pressure control is OK.  Lipid control is excellent.  Impaired glucose tolerance is about the same.  [facility] labs at the [facility] once yearly.  Otherwise stable and looking good.  Will continue monitor lipids, glu, a1c, and cbc q 6 mos. Hx of spindle cell  liposarcoma is distant and never recurred.  That was on his lower neck or upper back.  He has a chronic hemolytic anemia that has not been a problem.  Stable.  Discussed the need to avoid dehydration and NSAIDs regarding his CKD.  History of PSVT stable.  Annual wellness visit due December 2023.  Continue to monitor every 6 months.  Had a stress echo which was normal.  He is on Cardura 8 mg daily for BPH and Lopressor 50 mg twice daily for PSVT and those are reviewed today.  Blood pressure is much better since adding ramipril and boosting Cardura to 8 mg daily.      The above information was reviewed again today 03/03/23.  It continues to be accurate as reflected above and is unchanged.  History, physical and review of systems all reviewed and are unchanged.  Medications were reviewed today and continue the current dosing.    PPE today includes face mask and eye shield.    Patient has been erroneously marked as diabetic. Based on the available clinical information, he does not have diabetes and should therefore be excluded from diabetic health maintenance and quality measures for the remainder of the reporting period.         Dragon disclaimer:   Much of this encounter note is an electronic transcription/translation of spoken language to printed text. The electronic translation of spoken language may permit erroneous, or at times, nonsensical words or phrases to be inadvertently transcribed; Although I have reviewed the note for such errors, some may still exist.

## 2023-04-25 DIAGNOSIS — M54.50 ACUTE BILATERAL LOW BACK PAIN WITHOUT SCIATICA: ICD-10-CM

## 2023-04-25 RX ORDER — GABAPENTIN 300 MG/1
CAPSULE ORAL
Qty: 180 CAPSULE | Refills: 0 | Status: SHIPPED | OUTPATIENT
Start: 2023-04-25

## 2023-06-02 RX ORDER — RAMIPRIL 5 MG/1
CAPSULE ORAL
Qty: 90 CAPSULE | Refills: 1 | Status: SHIPPED | OUTPATIENT
Start: 2023-06-02

## 2023-07-22 DIAGNOSIS — M54.50 ACUTE BILATERAL LOW BACK PAIN WITHOUT SCIATICA: ICD-10-CM

## 2023-07-24 RX ORDER — GABAPENTIN 300 MG/1
CAPSULE ORAL
Qty: 180 CAPSULE | Refills: 3 | Status: SHIPPED | OUTPATIENT
Start: 2023-07-24

## 2023-09-20 ENCOUNTER — TELEPHONE (OUTPATIENT)
Dept: INTERNAL MEDICINE | Facility: CLINIC | Age: 78
End: 2023-09-20

## 2023-09-20 NOTE — TELEPHONE ENCOUNTER
"    Caller: Scout Linda \"Colin\"    Relationship: Self    Best call back number: [phone]     What orders are you requesting (i.e. lab or imaging): BLOOD WORK TO CHECK FOR KIDNEY DISEASE AND BONE CANCER     In what timeframe would the patient need to come in: ASAP    Where will you receive your lab/imaging services: [facility]     Additional notes: PLEASE CALL AND ADVISE       "

## 2023-09-20 NOTE — TELEPHONE ENCOUNTER
I am not sure what this note is all about.  He already has a full complement of lab work ordered and he can go anytime to get them done prior to his office visit.  Not sure I understand the question about bone cancer.  That is something we may need to talk about at his office visit.

## 2023-10-04 DIAGNOSIS — N52.8 OTHER MALE ERECTILE DYSFUNCTION: ICD-10-CM

## 2023-10-04 RX ORDER — SILDENAFIL 25 MG/1
TABLET, FILM COATED ORAL
Qty: 30 TABLET | Refills: 5 | Status: SHIPPED | OUTPATIENT
Start: 2023-10-04

## 2023-10-10 ENCOUNTER — OFFICE VISIT (OUTPATIENT)
Dept: INTERNAL MEDICINE | Facility: CLINIC | Age: 78
End: 2023-10-10
Payer: MEDICARE

## 2023-10-10 VITALS
WEIGHT: 196 LBS | HEIGHT: 70 IN | TEMPERATURE: 98.1 F | RESPIRATION RATE: 18 BRPM | DIASTOLIC BLOOD PRESSURE: 68 MMHG | HEART RATE: 74 BPM | BODY MASS INDEX: 28.06 KG/M2 | SYSTOLIC BLOOD PRESSURE: 112 MMHG | OXYGEN SATURATION: 95 %

## 2023-10-10 DIAGNOSIS — E78.5 HYPERLIPIDEMIA, UNSPECIFIED HYPERLIPIDEMIA TYPE: Chronic | ICD-10-CM

## 2023-10-10 DIAGNOSIS — I10 ESSENTIAL HYPERTENSION: Primary | ICD-10-CM

## 2023-10-10 DIAGNOSIS — R73.02 IGT (IMPAIRED GLUCOSE TOLERANCE): Chronic | ICD-10-CM

## 2023-10-10 PROBLEM — D47.2 MONOCLONAL GAMMOPATHY: Status: ACTIVE | Noted: 2023-10-10

## 2023-10-10 PROCEDURE — 1160F RVW MEDS BY RX/DR IN RCRD: CPT | Performed by: INTERNAL MEDICINE

## 2023-10-10 PROCEDURE — 1159F MED LIST DOCD IN RCRD: CPT | Performed by: INTERNAL MEDICINE

## 2023-10-10 PROCEDURE — 3074F SYST BP LT 130 MM HG: CPT | Performed by: INTERNAL MEDICINE

## 2023-10-10 PROCEDURE — 99214 OFFICE O/P EST MOD 30 MIN: CPT | Performed by: INTERNAL MEDICINE

## 2023-10-10 PROCEDURE — 3078F DIAST BP <80 MM HG: CPT | Performed by: INTERNAL MEDICINE

## 2023-10-10 NOTE — PROGRESS NOTES
Subjective   Scout Linda is a 78 y.o. male.     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  Also chronic hemolytic anemia.  Has CKD.  History of PSVT.  History of hemolytic anemia with thrombocytopenia.  Hypertension  This is a chronic problem. Pertinent negatives include no chest pain, palpitations or shortness of breath.   Hyperlipidemia  This is a chronic problem. The current episode started more than 1 year ago. The problem is controlled. Recent lipid tests were reviewed and are normal. Pertinent negatives include no chest pain or shortness of breath.   Hyperglycemia  This is a chronic problem. The current episode started more than 1 year ago. Pertinent negatives include no chest pain.   Anemia  There has been no palpitations.   Heartburn  He reports no chest pain or no wheezing.        The following portions of the patient's history were reviewed and updated as appropriate: allergies, current medications, past social history and problem list.    Outpatient Medications Marked as Taking for the 10/10/23 encounter (Office Visit) with Scout Callaway MD   Medication Sig Dispense Refill    doxazosin (CARDURA) 8 MG tablet       gabapentin (NEURONTIN) 300 MG capsule TAKE TWO CAPSULES BY MOUTH EVERY NIGHT AT BEDTIME 180 capsule 3    metoprolol tartrate (LOPRESSOR) 50 MG tablet Take 1 tablet by mouth 2 (Two) Times a Day. 180 tablet 1    omeprazole (PriLOSEC) 20 MG capsule Take 1 capsule by mouth Daily.      ramipril (ALTACE) 5 MG capsule TAKE ONE CAPSULE BY MOUTH DAILY 90 capsule 1    sildenafil (VIAGRA) 25 MG tablet TAKE 1-4 TABLETS BY MOUTH DAILY AS NEEDED FOR ERECTILE DYSFUNCTION 30 tablet 5    Simvastatin 20 MG/5ML suspension          Review of Systems   Respiratory:  Negative for shortness of breath and wheezing.    Cardiovascular:  Negative  for chest pain, palpitations and leg swelling.   Gastrointestinal:  Negative for constipation and diarrhea.       Objective   Vitals:    10/10/23 1339   BP: 112/68   Pulse: 74   Resp: 18   Temp: 98.1 øF (36.7 øC)   SpO2: 95%          10/10/23  1339   Weight: 88.9 kg (196 lb)    [unfilled]  Body mass index is 28.12 kg/mý.      Physical Exam   Constitutional: He appears well-developed.   Neck: No thyromegaly present.   Cardiovascular: Normal rate, regular rhythm and normal heart sounds. Exam reveals no gallop.   No murmur heard.  Pulmonary/Chest: Effort normal and breath sounds normal. No respiratory distress. He has no wheezes. He has no rales.   Abdominal: Soft. Normal appearance and bowel sounds are normal. He exhibits no mass. There is no abdominal tenderness. There is no guarding.   Neurological: He is alert.     Procedures      Problems Addressed this Visit          Cardiac and Vasculature    Hyperlipidemia (Chronic)    Essential hypertension - Primary       Endocrine and Metabolic    IGT (impaired glucose tolerance) (Chronic)     Diagnoses         Codes Comments    Essential hypertension    -  Primary ICD-10-CM: I10  ICD-9-CM: 401.9     Hyperlipidemia, unspecified hyperlipidemia type     ICD-10-CM: E78.5  ICD-9-CM: 272.4     IGT (impaired glucose tolerance)     ICD-10-CM: R73.02  ICD-9-CM: 790.22           Assessment & Plan    In for recheck of hypertension, hyperlipidemia, GERD and IGT today October 2023.  GERD is well controlled on Prilosec daily.  Tolerating lipid treatment well.  Blood pressure control is OK.  Lipid control is excellent.  Impaired glucose tolerance is about the same.  [facility]ual labs at the [facility] once yearly.  Otherwise stable and looking good.  Will continue monitor lipids, glu, a1c, and cbc q 6 mos. 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  That was on his lower neck or upper back.  He has a chronic hemolytic anemia that has not been a problem.  Stable.  [facility].  He has a newly diagnosed IgA lambda plasma cell dyscrasia.  Kappa lambda ratio was 0.2 with kappa light chain being 0.9 and lambda light chain being 50.  A bone marrow biopsy is planned.  Discussed the need to avoid dehydration and NSAIDs regarding his CKD.  History of PSVT stable.  Annual wellness visit due December 2023.  Continue to monitor every 6 months.  Had a stress echo which was normal.  He is on Cardura 8 mg daily for BPH and Lopressor 50 mg twice daily for PSVT and those are reviewed today.  Blood pressure is much better since adding ramipril and boosting Cardura to 8 mg daily.      The above information was reviewed again today 10/10/23.  It continues to be accurate as reflected above and is unchanged.  History, physical and review of systems all reviewed and are unchanged.  Medications were reviewed today and continue the current dosing.      Patient has been erroneously marked as diabetic. Based on the available clinical information, he does not have diabetes and should therefore be excluded from diabetic health maintenance and quality measures for the remainder of the reporting period.         Dragon disclaimer:   Much of this encounter note is an electronic transcription/translation of spoken language to printed text. The electronic translation of spoken language may permit erroneous, or at times, nonsensical words or phrases to be inadvertently transcribed; Although I have reviewed the note for such errors, some may still exist.                 Physical Exam  Constitutional:       Appearance: Normal appearance. He is well-developed.   Neck:      Thyroid: No thyromegaly.   Cardiovascular:      Rate and Rhythm: Normal rate and regular rhythm.      Heart sounds: Normal heart sounds. No murmur heard.     No gallop.   Pulmonary:      Effort: Pulmonary effort is normal. No respiratory distress.      Breath sounds: Normal breath  sounds. No wheezing or rales.   Abdominal:      General: Bowel sounds are normal.      Palpations: Abdomen is soft. There is no mass.      Tenderness: There is no abdominal tenderness. There is no guarding.   Neurological:      Mental Status: He is alert.

## 2023-11-15 ENCOUNTER — TELEPHONE (OUTPATIENT)
Dept: INTERNAL MEDICINE | Facility: CLINIC | Age: 78
End: 2023-11-15

## 2023-11-15 NOTE — TELEPHONE ENCOUNTER
Patient is calling regarding symptoms of diarrhea and would like to know what OTC would good for her symptoms

## 2023-11-15 NOTE — TELEPHONE ENCOUNTER
"Caller: Scout Linda \"Colin\"    Relationship to patient: Self    Best call back number: [phone]     Where are you experiencing symptoms: [place] (HAD FEVER TODAY)    How long have you been experiencing symptoms: 2 WEEKS    What therapies/medications have you tried: OTC MEDICATION    REQUESTING ADVISE     If a prescription is needed, what is your preferred pharmacy:   [facility] [id number] - [place] [id number] AT [facility] [address]  - [phone] [facility] [phone]  [phone]   "

## 2024-01-31 DIAGNOSIS — M54.50 ACUTE BILATERAL LOW BACK PAIN WITHOUT SCIATICA: ICD-10-CM

## 2024-02-01 RX ORDER — GABAPENTIN 300 MG/1
CAPSULE ORAL
Qty: 180 CAPSULE | Refills: 0 | Status: SHIPPED | OUTPATIENT
Start: 2024-02-01

## 2024-03-10 DIAGNOSIS — N52.8 OTHER MALE ERECTILE DYSFUNCTION: ICD-10-CM

## 2024-03-11 RX ORDER — SILDENAFIL 25 MG/1
TABLET, FILM COATED ORAL
Qty: 30 TABLET | Refills: 5 | Status: SHIPPED | OUTPATIENT
Start: 2024-03-11

## 2024-04-04 DIAGNOSIS — I10 ESSENTIAL HYPERTENSION: ICD-10-CM

## 2024-04-04 DIAGNOSIS — R73.02 IGT (IMPAIRED GLUCOSE TOLERANCE): Primary | Chronic | ICD-10-CM

## 2024-04-04 DIAGNOSIS — E78.5 HYPERLIPIDEMIA, UNSPECIFIED HYPERLIPIDEMIA TYPE: Chronic | ICD-10-CM

## 2024-04-05 LAB
BASOPHILS # BLD AUTO: 0 X10E3/UL (ref 0–0.2)
BASOPHILS NFR BLD AUTO: 1 %
CHOLEST SERPL-MCNC: 150 MG/DL (ref 100–199)
CHOLEST/HDLC SERPL: 2.5 RATIO (ref 0–5)
EOSINOPHIL # BLD AUTO: 0.1 X10E3/UL (ref 0–0.4)
EOSINOPHIL NFR BLD AUTO: 1 %
ERYTHROCYTE [DISTWIDTH] IN BLOOD BY AUTOMATED COUNT: 13.9 % (ref 11.6–15.4)
GLUCOSE SERPL-MCNC: 108 MG/DL (ref 70–99)
HBA1C MFR BLD: 6.4 % (ref 4.8–5.6)
HCT VFR BLD AUTO: 43.6 % (ref 37.5–51)
HDLC SERPL-MCNC: 59 MG/DL
HGB BLD-MCNC: 14.6 G/DL (ref 13–17.7)
IMM GRANULOCYTES # BLD AUTO: 0 X10E3/UL (ref 0–0.1)
IMM GRANULOCYTES NFR BLD AUTO: 0 %
LDLC SERPL CALC-MCNC: 64 MG/DL (ref 0–99)
LYMPHOCYTES # BLD AUTO: 1 X10E3/UL (ref 0.7–3.1)
LYMPHOCYTES NFR BLD AUTO: 18 %
MCH RBC QN AUTO: 29.3 PG (ref 26.6–33)
MCHC RBC AUTO-ENTMCNC: 33.5 G/DL (ref 31.5–35.7)
MCV RBC AUTO: 87 FL (ref 79–97)
MONOCYTES # BLD AUTO: 0.6 X10E3/UL (ref 0.1–0.9)
MONOCYTES NFR BLD AUTO: 10 %
NEUTROPHILS # BLD AUTO: 3.9 X10E3/UL (ref 1.4–7)
NEUTROPHILS NFR BLD AUTO: 70 %
PLATELET # BLD AUTO: 129 X10E3/UL (ref 150–450)
RBC # BLD AUTO: 4.99 X10E6/UL (ref 4.14–5.8)
TRIGL SERPL-MCNC: 161 MG/DL (ref 0–149)
VLDLC SERPL CALC-MCNC: 27 MG/DL (ref 5–40)
WBC # BLD AUTO: 5.6 X10E3/UL (ref 3.4–10.8)

## 2024-04-11 ENCOUNTER — OFFICE VISIT (OUTPATIENT)
Dept: INTERNAL MEDICINE | Facility: CLINIC | Age: 79
End: 2024-04-11
Payer: MEDICARE

## 2024-04-11 VITALS
RESPIRATION RATE: 16 BRPM | HEART RATE: 76 BPM | BODY MASS INDEX: 27.77 KG/M2 | WEIGHT: 194 LBS | OXYGEN SATURATION: 98 % | TEMPERATURE: 98 F | SYSTOLIC BLOOD PRESSURE: 102 MMHG | DIASTOLIC BLOOD PRESSURE: 60 MMHG | HEIGHT: 70 IN

## 2024-04-11 DIAGNOSIS — Z00.00 ENCOUNTER FOR ANNUAL WELLNESS EXAM IN MEDICARE PATIENT: ICD-10-CM

## 2024-04-11 DIAGNOSIS — I10 ESSENTIAL HYPERTENSION: Primary | ICD-10-CM

## 2024-04-11 DIAGNOSIS — Z79.899 MEDICATION MANAGEMENT: ICD-10-CM

## 2024-04-11 DIAGNOSIS — R73.02 IGT (IMPAIRED GLUCOSE TOLERANCE): Chronic | ICD-10-CM

## 2024-04-11 DIAGNOSIS — E78.5 HYPERLIPIDEMIA, UNSPECIFIED HYPERLIPIDEMIA TYPE: Chronic | ICD-10-CM

## 2024-04-11 DIAGNOSIS — I47.19 PAT (PAROXYSMAL ATRIAL TACHYCARDIA): Chronic | ICD-10-CM

## 2024-04-11 RX ORDER — NIFEDIPINE 30 MG/1
30 TABLET, EXTENDED RELEASE ORAL DAILY
COMMUNITY

## 2024-04-11 NOTE — PROGRESS NOTES
Subjective   Scout Linda is a 78 y.o. male.     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  Also chronic hemolytic anemia.  Has CKD.  History of PSVT.  History of hemolytic anemia with thrombocytopenia.  Hyperlipidemia  This is a chronic problem. The current episode started more than 1 year ago. The problem is controlled. Recent lipid tests were reviewed and are normal. Pertinent negatives include no chest pain or shortness of breath.   Hypertension  This is a chronic problem. Pertinent negatives include no chest pain, palpitations or shortness of breath.   Hyperglycemia  This is a chronic problem. The current episode started more than 1 year ago. Pertinent negatives include no chest pain.   Anemia  Presents for follow-up visit. There has been no palpitations.        The following portions of the patient's history were reviewed and updated as appropriate: allergies, current medications, past social history and problem list.    Outpatient Medications Marked as Taking for the 4/11/24 encounter (Office Visit) with Scout Callaway MD   Medication Sig Dispense Refill    doxazosin (CARDURA) 8 MG tablet       gabapentin (NEURONTIN) 300 MG capsule TAKE TWO CAPSULES BY MOUTH EVERY NIGHT AT BEDTIME 180 capsule 0    metoprolol tartrate (LOPRESSOR) 50 MG tablet Take 1 tablet by mouth 2 (Two) Times a Day. 180 tablet 1    NIFEdipine XL (PROCARDIA XL) 30 MG 24 hr tablet Take 1 tablet by mouth Daily.      omeprazole (PriLOSEC) 20 MG capsule Take 1 capsule by mouth Daily.      ramipril (ALTACE) 5 MG capsule TAKE ONE CAPSULE BY MOUTH DAILY 90 capsule 1    sildenafil (VIAGRA) 25 MG tablet TAKE ONE-FOUR TABLETS BY MOUTH DAILY AS NEEDED FOR ERECTILE DYSFUNCTION 30 tablet 5    Simvastatin 20 MG/5ML suspension          Review of Systems    Respiratory:  Negative for shortness of breath and wheezing.    Cardiovascular:  Negative for chest pain, palpitations and leg swelling.   Gastrointestinal:  Negative for constipation and diarrhea.       Objective   Vitals:    04/11/24 1307   BP: 102/60   Pulse: 76   Resp: 16   Temp: 98 °F (36.7 °C)   SpO2: 98%          04/11/24  1307   Weight: 88 kg (194 lb)    [unfilled]  Body mass index is 27.84 kg/m².      Physical Exam   Constitutional: He appears well-developed.   Neck: No thyromegaly present.   Cardiovascular: Normal rate, regular rhythm and normal heart sounds. Exam reveals no gallop.   No murmur heard.  Pulmonary/Chest: Effort normal and breath sounds normal. No respiratory distress. He has no wheezes. He has no rales.   Abdominal: Soft. Normal appearance and bowel sounds are normal. He exhibits no mass. There is no abdominal tenderness. There is no guarding.   Neurological: He is alert.     Procedures      Problems Addressed this Visit          Cardiac and Vasculature    PAT (paroxysmal atrial tachycardia) (Chronic)    Relevant Medications    NIFEdipine XL (PROCARDIA XL) 30 MG 24 hr tablet    Hyperlipidemia (Chronic)    Essential hypertension - Primary    Relevant Medications    NIFEdipine XL (PROCARDIA XL) 30 MG 24 hr tablet       Endocrine and Metabolic    IGT (impaired glucose tolerance) (Chronic)     Other Visit Diagnoses       Encounter for annual wellness exam in Medicare patient              Diagnoses         Codes Comments    Essential hypertension    -  Primary ICD-10-CM: I10  ICD-9-CM: 401.9     Hyperlipidemia, unspecified hyperlipidemia type     ICD-10-CM: E78.5  ICD-9-CM: 272.4     PAT (paroxysmal atrial tachycardia)     ICD-10-CM: I47.19  ICD-9-CM: 427.0     IGT (impaired glucose tolerance)     ICD-10-CM: R73.02  ICD-9-CM: 790.22     Encounter for annual wellness exam in Medicare patient     ICD-10-CM: Z00.00  ICD-9-CM: V70.0           Assessment & Plan    In for recheck of hypertension,  hyperlipidemia, GERD and IGT today April 2024.  GERD is well controlled on Prilosec daily.  Tolerating lipid treatment well.  Blood pressure control is OK.  Lipid control is excellent.  Impaired glucose tolerance is about the same.  [facility] at the [facility] yearly.  Otherwise stable and looking good.  Will continue monitor lipids, glu, a1c, and cbc q 6 mos. 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  That was on his lower neck or upper back.  He has a chronic hemolytic anemia that has not been a problem.  Stable.  [facility].  He has a IgA lambda plasma cell dyscrasia.  Kappa lambda ratio was 0.2 with kappa light chain being 0.9 and lambda light chain being 50.  A bone marrow biopsy was unremarkable.  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  Discussed the need to avoid dehydration and NSAIDs regarding his CKD.  History of PSVT stable.  Annual wellness visit due October 2024.  Continue to monitor every 6 months.  Had a stress echo which was normal.  He is on Cardura 8 mg daily for BPH and Lopressor 50 mg twice daily for PSVT and those are reviewed today.  Blood pressure is much better since adding ramipril and boosting Cardura to 8 mg daily.      The above information was reviewed again today 04/11/24.  It continues to be accurate as reflected above and is unchanged.  History, physical and review of systems all reviewed and are unchanged.  Medications were reviewed today and continue the current dosing.           Avinash disclaimer:   Much of this encounter note is an electronic transcription/translation of spoken language to printed text. The electronic translation of spoken language may permit erroneous, or at times, nonsensical words or phrases to be inadvertently transcribed; Although I have reviewed the note for such errors, some may still exist.

## 2024-04-11 NOTE — PROGRESS NOTES
The ABCs of the Annual Wellness Visit  Subsequent Medicare Wellness Visit    Subjective      Scout Linda is a 78 y.o. male who presents for a Subsequent Medicare Wellness Visit.    The following portions of the patient's history were reviewed and   updated as appropriate: allergies, current medications, past family history, past medical history, past social history, past surgical history, and problem list.    Compared to one year ago, the patient feels his physical   health is the same.    Compared to one year ago, the patient feels his mental   health is the same.    Recent Hospitalizations:  He was not admitted to the hospital during the last year.       Current Medical Providers:  Patient Care Team:  Scout Callaway MD as PCP - General (Internal Medicine)  Scout Callaway MD as PCP - Internal Medicine (Internal Medicine)    Outpatient Medications Prior to Visit   Medication Sig Dispense Refill    doxazosin (CARDURA) 8 MG tablet       gabapentin (NEURONTIN) 300 MG capsule TAKE TWO CAPSULES BY MOUTH EVERY NIGHT AT BEDTIME 180 capsule 0    metoprolol tartrate (LOPRESSOR) 50 MG tablet Take 1 tablet by mouth 2 (Two) Times a Day. 180 tablet 1    NIFEdipine XL (PROCARDIA XL) 30 MG 24 hr tablet Take 1 tablet by mouth Daily.      omeprazole (PriLOSEC) 20 MG capsule Take 1 capsule by mouth Daily.      ramipril (ALTACE) 5 MG capsule TAKE ONE CAPSULE BY MOUTH DAILY 90 capsule 1    sildenafil (VIAGRA) 25 MG tablet TAKE ONE-FOUR TABLETS BY MOUTH DAILY AS NEEDED FOR ERECTILE DYSFUNCTION 30 tablet 5    Simvastatin 20 MG/5ML suspension        No facility-administered medications prior to visit.       No opioid medication identified on active medication list. I have reviewed chart for other potential  high risk medication/s and harmful drug interactions in the elderly.        Aspirin is not on active medication list.  Aspirin use is not indicated based on review of current medical condition/s. Risk of harm outweighs potential  "benefits.  .    Patient Active Problem List   Diagnosis    Low back pain    PAT (paroxysmal atrial tachycardia)    GERD (gastroesophageal reflux disease)    Polyp of colon, adenomatous    IGT (impaired glucose tolerance)    BPH (benign prostatic hypertrophy)    Hyperlipidemia    Spindle cell liposarcoma    Thrombocytopenia    Other acquired hemolytic anemias    CKD (chronic kidney disease) stage 3, GFR 30-59 ml/min    Other male erectile dysfunction    Gallstones    Essential hypertension    Monoclonal gammopathy     Advance Care Planning   Advance Care Planning     Advance Directive is not on file.  ACP discussion was held with the patient during this visit. Patient has an advance directive (not in EMR), copy requested.     Objective    Vitals:    24 1307   BP: 102/60   Pulse: 76   Resp: 16   Temp: 98 °F (36.7 °C)   TempSrc: Temporal   SpO2: 98%   Weight: 88 kg (194 lb)   Height: 177.8 cm (70\")     Estimated body mass index is 27.84 kg/m² as calculated from the following:    Height as of this encounter: 177.8 cm (70\").    Weight as of this encounter: 88 kg (194 lb).    BMI is >= 25 and <30. (Overweight) The following options were offered after discussion;: exercise counseling/recommendations      Does the patient have evidence of cognitive impairment?   No    Lab Results   Component Value Date    CHLPL 150 2024    TRIG 161 (H) 2024    HDL 59 2024    LDL 64 2024    VLDL 27 2024    HGBA1C 6.4 (H) 2024          HEALTH RISK ASSESSMENT    Smoking Status:  Social History     Tobacco Use   Smoking Status Former    Current packs/day: 0.00    Average packs/day: 0.5 packs/day for 11.0 years (5.5 ttl pk-yrs)    Types: Cigarettes    Start date:     Quit date: 1987    Years since quittin.3    Passive exposure: Past   Smokeless Tobacco Never     Alcohol Consumption:  Social History     Substance and Sexual Activity   Alcohol Use Yes    Alcohol/week: 3.0 standard drinks of " alcohol    Types: 3 Cans of beer per week    Comment: 3 beers per week     Fall Risk Screen:    EDYTA Fall Risk Assessment was completed, and patient is at LOW risk for falls.Assessment completed on:2024    Depression Screenin/11/2024     1:04 PM   PHQ-2/PHQ-9 Depression Screening   Little Interest or Pleasure in Doing Things 0-->not at all   Feeling Down, Depressed or Hopeless 0-->not at all   PHQ-9: Brief Depression Severity Measure Score 0       Health Habits and Functional and Cognitive Screenin/11/2024     1:04 PM   Functional & Cognitive Status   Do you have difficulty preparing food and eating? No   Do you have difficulty bathing yourself, getting dressed or grooming yourself? No   Do you have difficulty using the toilet? No   Do you have difficulty moving around from place to place? No   Do you have trouble with steps or getting out of a bed or a chair? No   Current Diet Well Balanced Diet   Dental Exam Up to date   Eye Exam Up to date   Exercise (times per week) 0 times per week   Current Exercises Include No Regular Exercise   Do you need help using the phone?  No   Are you deaf or do you have serious difficulty hearing?  No   Do you need help to go to places out of walking distance? No   Do you need help shopping? No   Do you need help preparing meals?  No   Do you need help with housework?  No   Do you need help with laundry? No   Do you need help taking your medications? No   Do you need help managing money? No   Do you ever drive or ride in a car without wearing a seat belt? No       Age-appropriate Screening Schedule:  Refer to the list below for future screening recommendations based on patient's age, sex and/or medical conditions. Orders for these recommended tests are listed in the plan section. The patient has been provided with a written plan.    Health Maintenance   Topic Date Due    RSV Vaccine - Adults (1 - 1-dose 60+ series) Never done    COVID-19 Vaccine (  season) 09/01/2023    ANNUAL WELLNESS VISIT  12/09/2023    COLORECTAL CANCER SCREENING  10/01/2024    INFLUENZA VACCINE  08/01/2024    LIPID PANEL  04/04/2025    BMI FOLLOWUP  04/11/2025    TDAP/TD VACCINES (3 - Td or Tdap) 10/20/2032    HEPATITIS C SCREENING  Completed    Pneumococcal Vaccine 65+  Completed    ZOSTER VACCINE  Completed                  CMS Preventative Services Quick Reference  Risk Factors Identified During Encounter:    Fall Risk-High or Moderate: Information on Fall Prevention Shared in After Visit Summary    The above risks/problems have been discussed with the patient.  Pertinent information has been shared with the patient in the After Visit Summary.    Diagnoses and all orders for this visit:    1. Essential hypertension (Primary)    2. Hyperlipidemia, unspecified hyperlipidemia type    3. PAT (paroxysmal atrial tachycardia)    4. IGT (impaired glucose tolerance)    5. Encounter for annual wellness exam in Medicare patient        Follow Up:   Next Medicare Wellness visit to be scheduled in 1 year.      An After Visit Summary and PPPS were made available to the patient.

## 2024-04-11 NOTE — ADDENDUM NOTE
[address] [facility]    Chief Complaint   Patient presents with    Abdominal Aortic Aneurysm       History and Physical    62-year-old male following up regarding surveillance and care for aortic aneurysm. He has this history of a thoracic dissection repaired at [facility] with a endovascular device. He has had no new abdominal or back pain he does have chronic back pain for which he had his last spinal injection in October 2016. He is treated for COPD with multiple inhalers, chronic back pain, degenerative aorta with aneurysm. He does continue to smoke. He has no new chest pain chronic shortness of breath and his ambulation is limited he walks with a cane. Past Medical History   Diagnosis Date    CAD (coronary artery disease), native coronary artery      ALIDA X 2 to OM1    Carotid duplex 08/13/2013     Mild <50% bilateral ICA plaquing.  Complete heart block [facility]) June 2013     Medtronic dual chamber pacemaker    COPD (chronic obstructive pulmonary disease) (HCC)     Gastritis     GERD (gastroesophageal reflux disease)     High cholesterol     History of echocardiogram 06/04/2013     EF 60-65%. No WMA. Gr 1 DDfx. No significant valvular heart disease.  History of myocardial perfusion scan 04/18/2011     No ischemia or prior infarction. No WMA. EF 52%. Neg EKG on pharm stress test.    Hypertension     Hypothyroidism     Lower extremity venous duplex 01/06/2015     No DVT. Superficial insufficiency of right GSV. Deep venous reflux in right CFV & fem vein. Deep venous reflux in left CFV.  Renal duplex 12/24/2014     Mild < 60% RRA stenosis. Low parenchymal & LRA flow. Renal asymmetry. Intrinsic/med disease in right kidney. AAA (3.8 x 4.0 cm) infrarenal.    Right groin hernia      not resolved    S/P cardiac cath 06/02/2013     Diffuse atherosclerosis throughout. pRCA 50%. mLM 30%. mLAD 40%. oD2 (sm) 100%. pLCX 30%.   p/mOM1 95/80% (o/lapping 2.25 x 23-mm & 2.25 x 12-mm Xience Addended by: ALICIA FELTON on: 4/11/2024 03:15 PM     Modules accepted: Level of Service     stents, resid 0%). Patient Active Problem List   Diagnosis Code    HTN (hypertension) I10    Dyslipidemia E78.5    Aortic dissection, abdominal (HCC) I71.02    Coronary atherosclerosis of native coronary artery I25.10    Complete heart block (HCC) I44.2    Dyspnea R06.00    Lightheadedness R42    Vasovagal response R55    Tobacco abuse Z72.0    AAA (abdominal aortic aneurysm) (HCC) I71.4    Renal artery atherosclerosis, unilateral (HCC) I70.1    Varicose vein of leg I83.90    Thyroid activity decreased E03.9    Right groin hernia K40.90    CAD (coronary artery disease), native coronary artery I25.10    Gastroesophageal reflux disease without esophagitis K21.9    Hypertriglyceridemia E78.1    Vitamin D deficiency E55.9    Elevated serum creatinine R79.89    HNP (herniated nucleus pulposus), lumbar M51.26    Facet arthritis of lumbar region M46.96    Chronic pain G89.29    Muscle spasm of back M62.830    Tobacco dependency F17.200    Spondylosis of lumbar region without myelopathy or radiculopathy M47.816    Lumbar facet arthropathy (HCC) M12.88    Arthritis of lumbar spine M47.9     Past Surgical History   Procedure Laterality Date    Hx pacemaker  6/2013     Medtronic Pacemaker     Hx coronary stent placement  6/2013    Hx cholecystectomy  2008    Hx hernia repair  1996     Umbilical     Current Outpatient Prescriptions   Medication Sig Dispense Refill    ATENOLOL PO Take 1 Tab by mouth.  levothyroxine (SYNTHROID) 125 mcg tablet       cyanocobalamin 1,000 mcg tablet Take 1,000 mcg by mouth daily.  atorvastatin (LIPITOR) 40 mg tablet Take 40 mg by mouth two (2) times a day.  carvedilol (COREG) 6.25 mg tablet Take  by mouth two (2) times daily (with meals).  furosemide (LASIX) 40 mg tablet Take  by mouth every seven (7) days.  lisinopril (PRINIVIL, ZESTRIL) 10 mg tablet Take 1 Tab by mouth daily.  30 Tab 3    metoprolol (LOPRESSOR) 25 mg tablet Take 1 Tab by mouth every twelve (12) hours. 60 Tab 3    traMADol (ULTRAM) 50 mg tablet Take 1 Tab by mouth nightly as needed for Pain. Max Daily Amount: 50 mg. Indications: PAIN 30 Tab 0    gabapentin (NEURONTIN) 300 mg capsule Take 2 by mouth every pm 60 Cap 5    fluticasone-salmeterol (ADVAIR) 100-50 mcg/dose diskus inhaler Take 1 Puff by inhalation every twelve (12) hours. Indications: BRONCHOSPASM PREVENTION WITH COPD 1 Inhaler 0    ergocalciferol (ERGOCALCIFEROL) 50,000 unit capsule Take 1 capsule PO weekly, will be followed by 2000 units PO daily when complete 4 Cap 2    omega-3 acid ethyl esters (LOVAZA) 1 gram capsule Take 2 Caps by mouth daily (with breakfast). 180 Cap 3    rosuvastatin (CRESTOR) 20 mg tablet Take 1 Tab by mouth nightly. 90 Tab 0    aspirin 81 mg chewable tablet Take 1 Tab by mouth daily. 30 Tab 11    levothyroxine (SYNTHROID) 137 mcg tablet Take 137 mcg by mouth Daily (before breakfast). 30 Tab 3    albuterol (PROVENTIL HFA, VENTOLIN HFA, PROAIR HFA) 90 mcg/actuation inhaler Take 2 Puffs by inhalation every six (6) hours as needed for Wheezing. 1 Inhaler 0    dexlansoprazole (DEXILANT) 30 mg capsule Take 1 Cap by mouth daily. 90 Cap 3    nitroglycerin (NITROSTAT) 0.4 mg SL tablet 1 Tab by SubLINGual route every five (5) minutes as needed for Chest Pain (if chest pain not resolved after taking 3 call 911 ). 30 Tab 3     No Known Allergies    Review of Systems    A full review of systems was completed times ten organ systems and was deemed negative unless otherwise mentioned in the HPI.     Physical   Visit Vitals    /84 (BP 1 Location: Left arm, BP Patient Position: Sitting)    Pulse 88    Resp 20    Ht 6' 1\" (1.854 m)    Wt 260 lb (117.9 kg)    BMI 34.3 kg/m2       In no acute distress today strong smell of tobacco  No facial symmetry pupils are equal  Neck no JVD I do not hear a carotid bruit  Chest coarse breath sounds throughout limited inspiration  Cardiac regular  Abdomen obese soft nontender unable to palpate any aneurysm  Extremities without edema strength and range of motion seems symmetrical  No signs of acute arterial insufficiency  No skin ulcerations notable  CAT scan shows stable infrarenal aneurysm read a report as 4.1 cm but I measured it to be 4.6 which is similar in size from the last CT  High-resolution images give a good look to the renals and the neck of the aneurysm and can resubmit for possibility of infrarenal stent graft    Impression/Plan:     ICD-10-CM ICD-9-CM    1. Abdominal aortic aneurysm (AAA) without rupture (HCC) I71.4 441.4    2. Tobacco abuse Z72.0 305.1     will contact Mr. Chioma Carpio if any endovascular stent fit for him  He said stable CAT scan sizing of aneurysm, if he needs an advance or complicated repair again I would [facility] a [facility] capable of a made stent graft or complex care  Advised him strongly for smoke cessation regarding aneurysm growth and progression of COPD  No orders of the defined types were placed in this encounter. Follow-up Disposition: Not on 2020 Juaquin Barros MD    PLEASE NOTE:  This document has been produced using voice recognition software. Unrecognized errors in transcription may be present.

## 2024-04-30 DIAGNOSIS — M54.50 ACUTE BILATERAL LOW BACK PAIN WITHOUT SCIATICA: ICD-10-CM

## 2024-04-30 RX ORDER — GABAPENTIN 300 MG/1
CAPSULE ORAL
Qty: 180 CAPSULE | Refills: 0 | Status: SHIPPED | OUTPATIENT
Start: 2024-04-30

## 2024-07-16 NOTE — TELEPHONE ENCOUNTER
HAVE HIM BE PATIENT AND GIVE IT ANOTHER WEEK.  IF HE HAS NOT HEARD THEN LET'S CALL AND SET IT UP.  WOULD NOT RECOMMEND KIDNEY VITAMINS.    
I just do not know enough about those vitamins to be able to give him a good answer.  He is on his own as far as decision making.  Personally I do not take any vitamins.  
No
External [facility]/External [facility]

## 2024-07-29 DIAGNOSIS — M54.50 ACUTE BILATERAL LOW BACK PAIN WITHOUT SCIATICA: ICD-10-CM

## 2024-07-30 RX ORDER — GABAPENTIN 300 MG/1
CAPSULE ORAL
Qty: 180 CAPSULE | Refills: 0 | Status: SHIPPED | OUTPATIENT
Start: 2024-07-30

## 2024-08-25 DIAGNOSIS — N52.8 OTHER MALE ERECTILE DYSFUNCTION: ICD-10-CM

## 2024-08-26 RX ORDER — SILDENAFIL 25 MG/1
TABLET, FILM COATED ORAL
Qty: 30 TABLET | Refills: 5 | Status: SHIPPED | OUTPATIENT
Start: 2024-08-26

## 2024-10-30 DIAGNOSIS — M54.50 ACUTE BILATERAL LOW BACK PAIN WITHOUT SCIATICA: ICD-10-CM

## 2024-10-31 RX ORDER — GABAPENTIN 300 MG/1
CAPSULE ORAL
Qty: 180 CAPSULE | OUTPATIENT
Start: 2024-10-31

## 2024-11-11 ENCOUNTER — TELEMEDICINE (OUTPATIENT)
Dept: INTERNAL MEDICINE | Facility: CLINIC | Age: 79
End: 2024-11-11
Payer: MEDICARE

## 2024-11-11 VITALS — SYSTOLIC BLOOD PRESSURE: 120 MMHG | DIASTOLIC BLOOD PRESSURE: 80 MMHG

## 2024-11-11 DIAGNOSIS — I10 ESSENTIAL HYPERTENSION: Primary | ICD-10-CM

## 2024-11-11 DIAGNOSIS — D59.8 OTHER ACQUIRED HEMOLYTIC ANEMIAS: Chronic | ICD-10-CM

## 2024-11-11 DIAGNOSIS — R73.02 IGT (IMPAIRED GLUCOSE TOLERANCE): Chronic | ICD-10-CM

## 2024-11-11 DIAGNOSIS — E78.5 HYPERLIPIDEMIA, UNSPECIFIED HYPERLIPIDEMIA TYPE: Chronic | ICD-10-CM

## 2024-11-11 PROCEDURE — 3079F DIAST BP 80-89 MM HG: CPT | Performed by: INTERNAL MEDICINE

## 2024-11-11 PROCEDURE — 1126F AMNT PAIN NOTED NONE PRSNT: CPT | Performed by: INTERNAL MEDICINE

## 2024-11-11 PROCEDURE — G2211 COMPLEX E/M VISIT ADD ON: HCPCS | Performed by: INTERNAL MEDICINE

## 2024-11-11 PROCEDURE — 1159F MED LIST DOCD IN RCRD: CPT | Performed by: INTERNAL MEDICINE

## 2024-11-11 PROCEDURE — 3074F SYST BP LT 130 MM HG: CPT | Performed by: INTERNAL MEDICINE

## 2024-11-11 PROCEDURE — 1160F RVW MEDS BY RX/DR IN RCRD: CPT | Performed by: INTERNAL MEDICINE

## 2024-11-11 PROCEDURE — 99214 OFFICE O/P EST MOD 30 MIN: CPT | Performed by: INTERNAL MEDICINE

## 2024-11-11 NOTE — PROGRESS NOTES
Mode of Visit: Video  Location of patient: home  Location of provider: [facility] [place] [facility]  You have chosen to receive care through a telehealth visit.  Does the patient consent to use a video/audio connection for your medical care today? Yes  The visit included audio and video interaction. No technical issues occurred during this visit.           Subjective   Scout Linda is a 79 y.o. male.     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  Has CKD.  History of PSVT.  History of hemolytic anemia with thrombocytopenia.  Hypertension  This is a chronic problem. Pertinent negatives include no chest pain, palpitations or shortness of breath.   Hyperlipidemia  This is a chronic problem. The current episode started more than 1 year ago. The problem is controlled. Recent lipid tests were reviewed and are normal. Pertinent negatives include no chest pain or shortness of breath.   Hyperglycemia  This is a chronic problem. The current episode started more than 1 year ago. Pertinent negatives include no chest pain.   Anemia  Presents for follow-up visit. There has been no palpitations.        The following portions of the patient's history were reviewed and updated as appropriate: allergies, current medications, past social history and problem list.    Outpatient Medications Marked as Taking for the 11/11/24 encounter (Telemedicine) with Scout Callaway MD   Medication Sig Dispense Refill    doxazosin (CARDURA) 8 MG tablet       gabapentin (NEURONTIN) 300 MG capsule Take 2 capsules by mouth Every Night. 180 capsule 0    metoprolol tartrate (LOPRESSOR) 50 MG tablet Take 1 tablet by mouth 2 (Two) Times a Day. 180 tablet 1    NIFEdipine XL (PROCARDIA XL) 30 MG 24 hr tablet Take 1 tablet by mouth Daily. (Patient taking differently: Take 1 tablet by mouth Daily As Needed.)       omeprazole (PriLOSEC) 20 MG capsule Take 1 capsule by mouth Daily.      ramipril (ALTACE) 5 MG capsule TAKE ONE CAPSULE BY MOUTH DAILY 90 capsule 1    sildenafil (VIAGRA) 25 MG tablet TAKE 1-4 TABLETS BY MOUTH DAILY AS NEEDED FOR ERECTILE DYSFUNCTION 30 tablet 5       Review of Systems   Respiratory:  Negative for shortness of breath and wheezing.    Cardiovascular:  Negative for chest pain, palpitations and leg swelling.   Gastrointestinal:  Negative for constipation and diarrhea.       Objective   Vitals:    11/11/24 1112   BP: 120/80      There were no vitals filed for this visit.   [unfilled]  There is no height or weight on file to calculate BMI.      Physical Exam   Constitutional: He appears well-developed.   Neck: No thyromegaly present.   Pulmonary/Chest: Effort normal.   Abdominal: Normal appearance.   Neurological: He is alert.   Skin: Rash: ..   Psychiatric: His behavior is normal. Mood, judgment and thought content normal.           Problems Addressed this Visit          Cardiac and Vasculature    Hyperlipidemia (Chronic)    Relevant Orders    Lipid Panel With / Chol / HDL Ratio    Essential hypertension - Primary       Endocrine and Metabolic    IGT (impaired glucose tolerance) (Chronic)    Relevant Orders    Hemoglobin A1c    Glucose, Random       Hematology and Neoplasia    Other acquired hemolytic anemias (Chronic)    Relevant Orders    CBC & Differential     Diagnoses         Codes Comments    Essential hypertension    -  Primary ICD-10-CM: I10  ICD-9-CM: 401.9     Hyperlipidemia, unspecified hyperlipidemia type     ICD-10-CM: E78.5  ICD-9-CM: 272.4     IGT (impaired glucose tolerance)     ICD-10-CM: R73.02  ICD-9-CM: 790.22     Other acquired hemolytic anemias     ICD-10-CM: D59.8  ICD-9-CM: 283.2           Assessment & Plan    Video visit today.  In for 6 months recheck of hypertension, hyperlipidemia, GERD and IGT today November 2024.  Since last seen he stopped his simvastatin due to leg  cramps and that seemed to solve his problems.  He had 1 spell of hypotension and near syncope and stopped his nifedipine.  Blood pressures are running normal off the nifedipine.  GERD is well controlled on Prilosec daily.  Impaired glucose tolerance is about the same.  Annual labs at the [facility] once yearly.  Otherwise stable and looking good.  Will continue monitor lipids, glu, a1c, and cbc q 6 mos. 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  That was on his lower neck or upper back.  He has a chronic hemolytic anemia that has not been a problem.  Stable.  [facility] as [facility] service.  He has a IgA lambda plasma cell dyscrasia.  Kappa lambda ratio was 0.2 with kappa light chain being 0.9 and lambda light chain being 50.  A bone marrow biopsy was unremarkable.  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  Discussed the need to avoid dehydration and NSAIDs regarding his CKD.  History of PSVT stable.  Annual wellness visit due October 2024.  Continue to monitor every 6 months.  He is on Cardura 8 mg daily for BPH and Lopressor 50 mg twice daily for PSVT and those are reviewed today.  Has a colonoscopy coming up in 3 days.  Also advised to get RSV.  His flu shot and COVID vaccines are up-to-date.  Visualase platform used for the visit today.    The above information was reviewed again today 11/11/24.  It continues to be accurate as reflected above and is unchanged.  History, physical and review of systems all reviewed and are unchanged.  Medications were reviewed today and continue the current dosing.           Avinash disclaimer:   Much of this encounter note is an electronic transcription/translation of spoken language to printed text. The electronic translation of spoken language may permit erroneous, or at times, nonsensical words or phrases to be inadvertently transcribed; Although I have reviewed the note for such errors, some may still exist.

## 2024-11-13 LAB
BASOPHILS # BLD AUTO: 0 X10E3/UL (ref 0–0.2)
BASOPHILS NFR BLD AUTO: 1 %
CHOLEST SERPL-MCNC: 175 MG/DL (ref 100–199)
CHOLEST/HDLC SERPL: 3.9 RATIO (ref 0–5)
EOSINOPHIL # BLD AUTO: 0.1 X10E3/UL (ref 0–0.4)
EOSINOPHIL NFR BLD AUTO: 2 %
ERYTHROCYTE [DISTWIDTH] IN BLOOD BY AUTOMATED COUNT: 13.4 % (ref 11.6–15.4)
GLUCOSE SERPL-MCNC: 127 MG/DL (ref 70–99)
HBA1C MFR BLD: 6.6 % (ref 4.8–5.6)
HCT VFR BLD AUTO: 41.2 % (ref 37.5–51)
HDLC SERPL-MCNC: 45 MG/DL
HGB BLD-MCNC: 13.6 G/DL (ref 13–17.7)
IMM GRANULOCYTES # BLD AUTO: 0 X10E3/UL (ref 0–0.1)
IMM GRANULOCYTES NFR BLD AUTO: 0 %
LDLC SERPL CALC-MCNC: 66 MG/DL (ref 0–99)
LYMPHOCYTES # BLD AUTO: 1.2 X10E3/UL (ref 0.7–3.1)
LYMPHOCYTES NFR BLD AUTO: 18 %
MCH RBC QN AUTO: 29.1 PG (ref 26.6–33)
MCHC RBC AUTO-ENTMCNC: 33 G/DL (ref 31.5–35.7)
MCV RBC AUTO: 88 FL (ref 79–97)
MONOCYTES # BLD AUTO: 0.7 X10E3/UL (ref 0.1–0.9)
MONOCYTES NFR BLD AUTO: 10 %
NEUTROPHILS # BLD AUTO: 4.5 X10E3/UL (ref 1.4–7)
NEUTROPHILS NFR BLD AUTO: 69 %
PLATELET # BLD AUTO: 143 X10E3/UL (ref 150–450)
RBC # BLD AUTO: 4.67 X10E6/UL (ref 4.14–5.8)
TRIGL SERPL-MCNC: 411 MG/DL (ref 0–149)
VLDLC SERPL CALC-MCNC: 64 MG/DL (ref 5–40)
WBC # BLD AUTO: 6.5 X10E3/UL (ref 3.4–10.8)

## 2025-01-09 DIAGNOSIS — M54.50 ACUTE BILATERAL LOW BACK PAIN WITHOUT SCIATICA: ICD-10-CM

## 2025-01-10 RX ORDER — GABAPENTIN 300 MG/1
CAPSULE ORAL
Qty: 180 CAPSULE | Refills: 0 | Status: SHIPPED | OUTPATIENT
Start: 2025-01-10

## 2025-03-10 DIAGNOSIS — N52.8 OTHER MALE ERECTILE DYSFUNCTION: ICD-10-CM

## 2025-03-10 RX ORDER — SILDENAFIL 25 MG/1
TABLET, FILM COATED ORAL
Qty: 30 TABLET | Refills: 5 | Status: SHIPPED | OUTPATIENT
Start: 2025-03-10

## 2025-04-02 LAB
ALBUMIN SERPL-MCNC: 3.8 G/DL (ref 3.8–4.8)
ALP SERPL-CCNC: 52 IU/L (ref 44–121)
ALT SERPL-CCNC: 13 IU/L (ref 0–44)
AST SERPL-CCNC: 14 IU/L (ref 0–40)
BASOPHILS # BLD AUTO: 0 X10E3/UL (ref 0–0.2)
BASOPHILS NFR BLD AUTO: 1 %
BILIRUB SERPL-MCNC: 0.3 MG/DL (ref 0–1.2)
BUN SERPL-MCNC: 20 MG/DL (ref 8–27)
BUN/CREAT SERPL: 12 (ref 10–24)
CALCIUM SERPL-MCNC: 8.8 MG/DL (ref 8.6–10.2)
CHLORIDE SERPL-SCNC: 108 MMOL/L (ref 96–106)
CHOLEST SERPL-MCNC: 153 MG/DL (ref 100–199)
CHOLEST/HDLC SERPL: 3.7 RATIO (ref 0–5)
CO2 SERPL-SCNC: 22 MMOL/L (ref 20–29)
CREAT SERPL-MCNC: 1.65 MG/DL (ref 0.76–1.27)
EGFRCR SERPLBLD CKD-EPI 2021: 43 ML/MIN/1.73
EOSINOPHIL # BLD AUTO: 0.1 X10E3/UL (ref 0–0.4)
EOSINOPHIL NFR BLD AUTO: 2 %
ERYTHROCYTE [DISTWIDTH] IN BLOOD BY AUTOMATED COUNT: 13.6 % (ref 11.6–15.4)
GLOBULIN SER CALC-MCNC: 2.1 G/DL (ref 1.5–4.5)
GLUCOSE SERPL-MCNC: 170 MG/DL (ref 70–99)
HBA1C MFR BLD: 6.6 % (ref 4.8–5.6)
HCT VFR BLD AUTO: 41.1 % (ref 37.5–51)
HDLC SERPL-MCNC: 41 MG/DL
HGB BLD-MCNC: 13.3 G/DL (ref 13–17.7)
IMM GRANULOCYTES # BLD AUTO: 0 X10E3/UL (ref 0–0.1)
IMM GRANULOCYTES NFR BLD AUTO: 1 %
LDLC SERPL CALC-MCNC: 75 MG/DL (ref 0–99)
LYMPHOCYTES # BLD AUTO: 1 X10E3/UL (ref 0.7–3.1)
LYMPHOCYTES NFR BLD AUTO: 17 %
MCH RBC QN AUTO: 28.5 PG (ref 26.6–33)
MCHC RBC AUTO-ENTMCNC: 32.4 G/DL (ref 31.5–35.7)
MCV RBC AUTO: 88 FL (ref 79–97)
MONOCYTES # BLD AUTO: 0.5 X10E3/UL (ref 0.1–0.9)
MONOCYTES NFR BLD AUTO: 9 %
NEUTROPHILS # BLD AUTO: 4.4 X10E3/UL (ref 1.4–7)
NEUTROPHILS NFR BLD AUTO: 70 %
PLATELET # BLD AUTO: 155 X10E3/UL (ref 150–450)
POTASSIUM SERPL-SCNC: 5.2 MMOL/L (ref 3.5–5.2)
PROT SERPL-MCNC: 5.9 G/DL (ref 6–8.5)
RBC # BLD AUTO: 4.66 X10E6/UL (ref 4.14–5.8)
SODIUM SERPL-SCNC: 141 MMOL/L (ref 134–144)
TRIGL SERPL-MCNC: 227 MG/DL (ref 0–149)
VLDLC SERPL CALC-MCNC: 37 MG/DL (ref 5–40)
WBC # BLD AUTO: 6 X10E3/UL (ref 3.4–10.8)

## 2025-04-11 ENCOUNTER — OFFICE VISIT (OUTPATIENT)
Dept: INTERNAL MEDICINE | Facility: CLINIC | Age: 80
End: 2025-04-11
Payer: MEDICARE

## 2025-04-11 VITALS
OXYGEN SATURATION: 97 % | BODY MASS INDEX: 27.2 KG/M2 | HEIGHT: 70 IN | SYSTOLIC BLOOD PRESSURE: 110 MMHG | RESPIRATION RATE: 16 BRPM | HEART RATE: 76 BPM | DIASTOLIC BLOOD PRESSURE: 62 MMHG | WEIGHT: 190 LBS | TEMPERATURE: 97.9 F

## 2025-04-11 DIAGNOSIS — I10 ESSENTIAL HYPERTENSION: Primary | ICD-10-CM

## 2025-04-11 DIAGNOSIS — Z12.11 ENCOUNTER FOR SCREENING FOR MALIGNANT NEOPLASM OF COLON: ICD-10-CM

## 2025-04-11 DIAGNOSIS — D59.8 OTHER ACQUIRED HEMOLYTIC ANEMIAS: ICD-10-CM

## 2025-04-11 DIAGNOSIS — R73.02 IGT (IMPAIRED GLUCOSE TOLERANCE): Chronic | ICD-10-CM

## 2025-04-11 DIAGNOSIS — Z00.00 ENCOUNTER FOR ANNUAL WELLNESS EXAM IN MEDICARE PATIENT: ICD-10-CM

## 2025-04-11 DIAGNOSIS — E78.5 HYPERLIPIDEMIA, UNSPECIFIED HYPERLIPIDEMIA TYPE: Chronic | ICD-10-CM

## 2025-04-11 RX ORDER — SIMVASTATIN 10 MG
10 TABLET ORAL NIGHTLY
COMMUNITY
End: 2025-04-11 | Stop reason: SDUPTHER

## 2025-04-11 RX ORDER — SIMVASTATIN 10 MG
10 TABLET ORAL NIGHTLY
Qty: 90 TABLET | Refills: 3 | Status: SHIPPED | OUTPATIENT
Start: 2025-04-11

## 2025-04-11 NOTE — PROGRESS NOTES
Subjective   Scout Linda is a 79 y.o. male.     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  Has CKD.  History of PSVT.  History of hemolytic anemia with thrombocytopenia.  Hyperlipidemia  This is a chronic problem. The current episode started more than 1 year ago. The problem is controlled. Recent lipid tests were reviewed and are normal. Pertinent negatives include no chest pain or shortness of breath.   Hyperglycemia  Symptoms are chronic.   Onset was more than 1 year ago.   Pertinent negative symptoms include no chest pain.   Heartburn  He reports no chest pain or no wheezing.   Hypertension  This is a chronic problem. Pertinent negatives include no chest pain, palpitations or shortness of breath.   Anemia  Presents for follow-up visit. There has been no palpitations.        The following portions of the patient's history were reviewed and updated as appropriate: allergies, current medications, past social history and problem list.    Outpatient Medications Marked as Taking for the 4/11/25 encounter (Office Visit) with Scout Callaway MD   Medication Sig Dispense Refill    doxazosin (CARDURA) 8 MG tablet       gabapentin (NEURONTIN) 300 MG capsule TAKE 2 CAPSULES BY MOUTH ONCE NIGHTLY 180 capsule 0    metoprolol tartrate (LOPRESSOR) 50 MG tablet Take 1 tablet by mouth 2 (Two) Times a Day. 180 tablet 1    NIFEdipine XL (PROCARDIA XL) 30 MG 24 hr tablet Take 1 tablet by mouth Daily. (Patient taking differently: Take 1 tablet by mouth Daily As Needed.)      omeprazole (PriLOSEC) 20 MG capsule Take 1 capsule by mouth Daily.      ramipril (ALTACE) 5 MG capsule TAKE ONE CAPSULE BY MOUTH DAILY 90 capsule 1    sildenafil (VIAGRA) 25 MG tablet TAKE 1-4 TABLETS BY MOUTH DAILY AS NEEDED FOR ERECTILE DYSFUNCTION 30 tablet 5    Simvastatin 20 MG/5ML suspension           Review of Systems   Respiratory:  Negative for shortness of breath and wheezing.    Cardiovascular:  Negative for chest pain, palpitations and leg swelling.   Gastrointestinal:  Negative for constipation and diarrhea.       Objective   Vitals:    04/11/25 1253   BP: 110/62   Pulse: 76   Resp: 16   Temp: 97.9 °F (36.6 °C)   SpO2: 97%          04/11/25  1253   Weight: 86.2 kg (190 lb)      [unfilled]  Body mass index is 27.26 kg/m².      Physical Exam  Constitutional:       Appearance: Normal appearance. He is well-developed.   Neck:      Thyroid: No thyromegaly.   Cardiovascular:      Rate and Rhythm: Normal rate and regular rhythm.      Heart sounds: Normal heart sounds. No murmur heard.     No gallop.   Pulmonary:      Effort: Pulmonary effort is normal. No respiratory distress.      Breath sounds: Normal breath sounds. No wheezing or rales.   Abdominal:      General: Bowel sounds are normal.      Palpations: Abdomen is soft. There is no mass.      Tenderness: There is no abdominal tenderness. There is no guarding.   Neurological:      Mental Status: He is alert.            Problems Addressed this Visit          Cardiac and Vasculature    Hyperlipidemia (Chronic)                     Essential hypertension - Primary                       Endocrine and Metabolic    IGT (impaired glucose tolerance) (Chronic)                   Other Visit Diagnoses         Encounter for annual wellness exam in Medicare patient              Diagnoses         Codes Comments      Essential hypertension    -  Primary ICD-10-CM: I10  ICD-9-CM: 401.9       Hyperlipidemia, unspecified hyperlipidemia type     ICD-10-CM: E78.5  ICD-9-CM: 272.4       IGT (impaired glucose tolerance)     ICD-10-CM: R73.02  ICD-9-CM: 790.22       Encounter for annual wellness exam in Medicare patient     ICD-10-CM: Z00.00  ICD-9-CM: V70.0           Assessment & Plan    In for 6-month recheck of hypertension, hyperlipidemia, IGT and GERD today April 2025.   Previously he stopped his simvastatin due to leg cramps and that seemed to solve his problems.  He is now back on 10 mg daily and doing fine with it.  He previously had 1 spell of hypotension and near syncope and stopped his nifedipine.  Blood pressures are running normal off the nifedipine.  GERD is well controlled on Prilosec daily.  Impaired glucose tolerance is about the same.  [facility]ual labs at the [facility] once yearly.  Otherwise stable and looking good.  Will continue monitor lipids, glu, a1c, and cbc q 6 mos. 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  That was on his lower neck or upper back.  He has a chronic hemolytic anemia that has not been a problem.  Stable.  [facility] as [facility].  He has a IgA lambda plasma cell dyscrasia.  Kappa lambda ratio was 0.2 with kappa light chain being 0.9 and lambda light chain being 50.  A bone marrow biopsy was unremarkable.  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  Discussed the need to avoid dehydration and NSAIDs regarding his CKD.  History of PSVT stable.  Annual wellness visit due today April 2025.  Continue to monitor every 6 months.  He is on Cardura 8 mg daily for BPH and Lopressor 50 mg twice daily for PSVT and those are reviewed today.      The above information was reviewed again today 04/11/25.  It continues to be accurate as reflected above and is unchanged.  History, physical and review of systems all reviewed and are unchanged.  Medications were reviewed today and continue the current dosing.           Avinash disclaimer:   Much of this encounter note is an electronic transcription/translation of spoken language to printed text. The electronic translation of spoken language may permit erroneous, or at times, nonsensical words or phrases to be inadvertently transcribed; Although I have reviewed the note for such errors, some may still exist.

## 2025-04-11 NOTE — PROGRESS NOTES
Subjective   The ABCs of the Annual Wellness Visit  Medicare Wellness Visit      Scout Linda is a 79 y.o. patient who presents for a Medicare Wellness Visit.    The following portions of the patient's history were reviewed and   updated as appropriate: allergies, current medications, past family history, past medical history, past social history, past surgical history, and problem list.    Compared to one year ago, the patient's physical   health is the same.  Compared to one year ago, the patient's mental   health is the same.     Recent Hospitalizations:  He was not admitted to the hospital during the last year.     Current Medical Providers:  Patient Care Team:  Scout Callaway MD as PCP - General (Internal Medicine)  Scout Callaway MD as PCP - Internal Medicine (Internal Medicine)    Outpatient Medications Prior to Visit   Medication Sig Dispense Refill    doxazosin (CARDURA) 8 MG tablet       gabapentin (NEURONTIN) 300 MG capsule TAKE 2 CAPSULES BY MOUTH ONCE NIGHTLY 180 capsule 0    metoprolol tartrate (LOPRESSOR) 50 MG tablet Take 1 tablet by mouth 2 (Two) Times a Day. 180 tablet 1    NIFEdipine XL (PROCARDIA XL) 30 MG 24 hr tablet Take 1 tablet by mouth Daily. (Patient taking differently: Take 1 tablet by mouth Daily As Needed.)      omeprazole (PriLOSEC) 20 MG capsule Take 1 capsule by mouth Daily.      ramipril (ALTACE) 5 MG capsule TAKE ONE CAPSULE BY MOUTH DAILY 90 capsule 1    sildenafil (VIAGRA) 25 MG tablet TAKE 1-4 TABLETS BY MOUTH DAILY AS NEEDED FOR ERECTILE DYSFUNCTION 30 tablet 5    Simvastatin 20 MG/5ML suspension        No facility-administered medications prior to visit.     No opioid medication identified on active medication list. I have reviewed chart for other potential  high risk medication/s and harmful drug interactions in the elderly.      Aspirin is not on active medication list.  Aspirin use is not indicated based on review of current medical condition/s. Risk of harm  "outweighs potential benefits.  .    Patient Active Problem List   Diagnosis    Low back pain    PAT (paroxysmal atrial tachycardia)    GERD (gastroesophageal reflux disease)    Polyp of colon, adenomatous    IGT (impaired glucose tolerance)    BPH (benign prostatic hypertrophy)    Hyperlipidemia    Spindle cell liposarcoma    Thrombocytopenia    Other acquired hemolytic anemias    CKD (chronic kidney disease) stage 3, GFR 30-59 ml/min    Other male erectile dysfunction    Gallstones    Essential hypertension    Monoclonal gammopathy     Advance Care Planning Advance Directive is not on file.  ACP discussion was held with the patient during this visit. Patient has an advance directive (not in EMR), copy requested.            Objective   Vitals:    25 1253   BP: 110/62   Pulse: 76   Resp: 16   Temp: 97.9 °F (36.6 °C)   TempSrc: Temporal   SpO2: 97%   Weight: 86.2 kg (190 lb)   Height: 177.8 cm (70\")   PainSc: 0-No pain       Estimated body mass index is 27.26 kg/m² as calculated from the following:    Height as of this encounter: 177.8 cm (70\").    Weight as of this encounter: 86.2 kg (190 lb).    BMI is >= 25 and <30. (Overweight) The following options were offered after discussion;: exercise counseling/recommendations           Does the patient have evidence of cognitive impairment? No  Lab Results   Component Value Date    CHLPL 153 2025    TRIG 227 (H) 2025    HDL 41 2025    LDL 75 2025    VLDL 37 2025    HGBA1C 6.6 (H) 2025                                                                                               Health  Risk Assessment    Smoking Status:  Social History     Tobacco Use   Smoking Status Former    Current packs/day: 0.00    Average packs/day: 0.5 packs/day for 11.0 years (5.5 ttl pk-yrs)    Types: Cigarettes    Start date:     Quit date: 1987    Years since quittin.3    Passive exposure: Past   Smokeless Tobacco Never     Alcohol " Consumption:  Social History     Substance and Sexual Activity   Alcohol Use Yes    Alcohol/week: 3.0 standard drinks of alcohol    Types: 3 Cans of beer per week    Comment: 3 beers per week       Fall Risk Screen  KVNGADI Fall Risk Assessment was completed, and patient is at LOW risk for falls.Assessment completed on:2025    Depression Screening   Little interest or pleasure in doing things? Not at all   Feeling down, depressed, or hopeless? Not at all   PHQ-2 Total Score 0      Health Habits and Functional and Cognitive Screenin/11/2025    12:56 PM   Functional & Cognitive Status   Do you have difficulty preparing food and eating? No   Do you have difficulty bathing yourself, getting dressed or grooming yourself? No   Do you have difficulty using the toilet? No   Do you have difficulty moving around from place to place? No   Do you have trouble with steps or getting out of a bed or a chair? No   Current Diet Well Balanced Diet   Dental Exam Up to date   Eye Exam Up to date   Exercise (times per week) 0 times per week   Current Exercises Include No Regular Exercise   Do you need help using the phone?  No   Are you deaf or do you have serious difficulty hearing?  No   Do you need help to go to places out of walking distance? No   Do you need help shopping? No   Do you need help preparing meals?  No   Do you need help with housework?  No   Do you need help with laundry? No   Do you need help taking your medications? No   Do you need help managing money? No   Do you ever drive or ride in a car without wearing a seat belt? No   Have you felt unusual stress, anger or loneliness in the last month? No   Who do you live with? Spouse   If you need help, do you have trouble finding someone available to you? No   Have you been bothered in the last four weeks by sexual problems? No   Do you have difficulty concentrating, remembering or making decisions? No           Age-appropriate Screening Schedule:  Refer to  the list below for future screening recommendations based on patient's age, sex and/or medical conditions. Orders for these recommended tests are listed in the plan section. The patient has been provided with a written plan.    Health Maintenance List  Health Maintenance   Topic Date Due    RSV Vaccine - Adults (1 - 1-dose 75+ series) Never done    COLORECTAL CANCER SCREENING  10/01/2024    ANNUAL WELLNESS VISIT  04/11/2025    COVID-19 Vaccine (6 - 2024-25 season) 04/21/2025    INFLUENZA VACCINE  07/01/2025    LIPID PANEL  04/01/2026    TDAP/TD VACCINES (4 - Td or Tdap) 10/20/2032    HEPATITIS C SCREENING  Completed    Pneumococcal Vaccine 50+  Completed    ZOSTER VACCINE  Completed    HEMOGLOBIN A1C  Discontinued                                                                                                                                                CMS Preventative Services Quick Reference  Risk Factors Identified During Encounter  Fall Risk-High or Moderate: Information on Fall Prevention Shared in After Visit Summary  Normal Balance     The above risks/problems have been discussed with the patient.  Pertinent information has been shared with the patient in the After Visit Summary.  An After Visit Summary and PPPS were made available to the patient.    Follow Up:   Next Medicare Wellness visit to be scheduled in 1 year.     Assessment & Plan  Essential hypertension           Hyperlipidemia, unspecified hyperlipidemia type            IGT (impaired glucose tolerance)         Encounter for annual wellness exam in Medicare patient              Follow Up:   No follow-ups on file.

## 2025-04-21 DIAGNOSIS — M54.50 ACUTE BILATERAL LOW BACK PAIN WITHOUT SCIATICA: ICD-10-CM

## 2025-04-21 RX ORDER — GABAPENTIN 300 MG/1
CAPSULE ORAL
Qty: 180 CAPSULE | Refills: 0 | Status: SHIPPED | OUTPATIENT
Start: 2025-04-21

## 2025-07-24 DIAGNOSIS — M54.50 ACUTE BILATERAL LOW BACK PAIN WITHOUT SCIATICA: ICD-10-CM

## 2025-07-25 RX ORDER — GABAPENTIN 300 MG/1
CAPSULE ORAL
Qty: 180 CAPSULE | Refills: 0 | Status: SHIPPED | OUTPATIENT
Start: 2025-07-25

## 2025-08-08 DIAGNOSIS — N52.8 OTHER MALE ERECTILE DYSFUNCTION: ICD-10-CM

## 2025-08-08 RX ORDER — SILDENAFIL 25 MG/1
TABLET, FILM COATED ORAL
Qty: 30 TABLET | Refills: 5 | Status: SHIPPED | OUTPATIENT
Start: 2025-08-08